# Patient Record
Sex: MALE | Race: OTHER | HISPANIC OR LATINO | Employment: FULL TIME | URBAN - METROPOLITAN AREA
[De-identification: names, ages, dates, MRNs, and addresses within clinical notes are randomized per-mention and may not be internally consistent; named-entity substitution may affect disease eponyms.]

---

## 2017-07-27 ENCOUNTER — GENERIC CONVERSION - ENCOUNTER (OUTPATIENT)
Dept: OTHER | Facility: OTHER | Age: 42
End: 2017-07-27

## 2017-07-27 ENCOUNTER — APPOINTMENT (EMERGENCY)
Dept: RADIOLOGY | Facility: HOSPITAL | Age: 42
DRG: 287 | End: 2017-07-27
Payer: COMMERCIAL

## 2017-07-27 ENCOUNTER — APPOINTMENT (INPATIENT)
Dept: NON INVASIVE DIAGNOSTICS | Facility: HOSPITAL | Age: 42
DRG: 287 | End: 2017-07-27
Payer: COMMERCIAL

## 2017-07-27 ENCOUNTER — APPOINTMENT (INPATIENT)
Dept: NON INVASIVE DIAGNOSTICS | Facility: HOSPITAL | Age: 42
DRG: 287 | End: 2017-07-27
Attending: INTERNAL MEDICINE
Payer: COMMERCIAL

## 2017-07-27 ENCOUNTER — HOSPITAL ENCOUNTER (INPATIENT)
Facility: HOSPITAL | Age: 42
LOS: 1 days | Discharge: HOME | DRG: 287 | End: 2017-07-28
Attending: EMERGENCY MEDICINE | Admitting: INTERNAL MEDICINE
Payer: COMMERCIAL

## 2017-07-27 DIAGNOSIS — R07.89 OTHER CHEST PAIN: ICD-10-CM

## 2017-07-27 DIAGNOSIS — I21.4 NSTEMI (NON-ST ELEVATED MYOCARDIAL INFARCTION) (HCC): Primary | ICD-10-CM

## 2017-07-27 DIAGNOSIS — R77.8 ELEVATED TROPONIN: ICD-10-CM

## 2017-07-27 PROBLEM — R07.9 CHEST PAIN: Status: ACTIVE | Noted: 2017-07-27

## 2017-07-27 LAB
ALBUMIN SERPL BCP-MCNC: 4 G/DL (ref 3.5–5)
ALP SERPL-CCNC: 75 U/L (ref 46–116)
ALT SERPL W P-5'-P-CCNC: 28 U/L (ref 12–78)
ANION GAP SERPL CALCULATED.3IONS-SCNC: 3 MMOL/L (ref 4–13)
APTT PPP: 29 SECONDS (ref 23–35)
AST SERPL W P-5'-P-CCNC: 18 U/L (ref 5–45)
BASOPHILS # BLD AUTO: 0.06 THOUSANDS/ΜL (ref 0–0.1)
BASOPHILS NFR BLD AUTO: 1 % (ref 0–1)
BILIRUB SERPL-MCNC: 0.26 MG/DL (ref 0.2–1)
BUN SERPL-MCNC: 15 MG/DL (ref 5–25)
CALCIUM SERPL-MCNC: 9.1 MG/DL (ref 8.3–10.1)
CHLORIDE SERPL-SCNC: 105 MMOL/L (ref 100–108)
CO2 SERPL-SCNC: 31 MMOL/L (ref 21–32)
CREAT SERPL-MCNC: 1.07 MG/DL (ref 0.6–1.3)
EOSINOPHIL # BLD AUTO: 0.38 THOUSAND/ΜL (ref 0–0.61)
EOSINOPHIL NFR BLD AUTO: 8 % (ref 0–6)
ERYTHROCYTE [DISTWIDTH] IN BLOOD BY AUTOMATED COUNT: 13.8 % (ref 11.6–15.1)
GFR SERPL CREATININE-BSD FRML MDRD: 86 ML/MIN/1.73SQ M
GLUCOSE SERPL-MCNC: 109 MG/DL (ref 65–140)
HCT VFR BLD AUTO: 40.2 % (ref 36.5–49.3)
HGB BLD-MCNC: 14 G/DL (ref 12–17)
INR PPP: 1.05 (ref 0.86–1.16)
LYMPHOCYTES # BLD AUTO: 2.49 THOUSANDS/ΜL (ref 0.6–4.47)
LYMPHOCYTES NFR BLD AUTO: 50 % (ref 14–44)
MCH RBC QN AUTO: 30.4 PG (ref 26.8–34.3)
MCHC RBC AUTO-ENTMCNC: 34.8 G/DL (ref 31.4–37.4)
MCV RBC AUTO: 87 FL (ref 82–98)
MONOCYTES # BLD AUTO: 0.6 THOUSAND/ΜL (ref 0.17–1.22)
MONOCYTES NFR BLD AUTO: 12 % (ref 4–12)
NEUTROPHILS # BLD AUTO: 1.41 THOUSANDS/ΜL (ref 1.85–7.62)
NEUTS SEG NFR BLD AUTO: 29 % (ref 43–75)
NRBC BLD AUTO-RTO: 0 /100 WBCS
PLATELET # BLD AUTO: 290 THOUSANDS/UL (ref 149–390)
PLATELET # BLD AUTO: 303 THOUSANDS/UL (ref 149–390)
PMV BLD AUTO: 10.9 FL (ref 8.9–12.7)
PMV BLD AUTO: 11 FL (ref 8.9–12.7)
POTASSIUM SERPL-SCNC: 4.9 MMOL/L (ref 3.5–5.3)
PROT SERPL-MCNC: 7.2 G/DL (ref 6.4–8.2)
PROTHROMBIN TIME: 13.7 SECONDS (ref 12.1–14.4)
RBC # BLD AUTO: 4.6 MILLION/UL (ref 3.88–5.62)
SODIUM SERPL-SCNC: 139 MMOL/L (ref 136–145)
SPECIMEN SOURCE: ABNORMAL
SPECIMEN SOURCE: NORMAL
TROPONIN I BLD-MCNC: 0 NG/ML (ref 0–0.08)
TROPONIN I BLD-MCNC: 0.25 NG/ML (ref 0–0.08)
TROPONIN I SERPL-MCNC: 0.52 NG/ML
TROPONIN I SERPL-MCNC: 11.49 NG/ML
TROPONIN I SERPL-MCNC: 21.72 NG/ML
TROPONIN I SERPL-MCNC: 22.19 NG/ML
WBC # BLD AUTO: 4.94 THOUSAND/UL (ref 4.31–10.16)

## 2017-07-27 PROCEDURE — 99152 MOD SED SAME PHYS/QHP 5/>YRS: CPT | Performed by: PHYSICIAN ASSISTANT

## 2017-07-27 PROCEDURE — C1769 GUIDE WIRE: HCPCS | Performed by: PHYSICIAN ASSISTANT

## 2017-07-27 PROCEDURE — 84484 ASSAY OF TROPONIN QUANT: CPT

## 2017-07-27 PROCEDURE — 99153 MOD SED SAME PHYS/QHP EA: CPT | Performed by: PHYSICIAN ASSISTANT

## 2017-07-27 PROCEDURE — 93005 ELECTROCARDIOGRAM TRACING: CPT

## 2017-07-27 PROCEDURE — 84484 ASSAY OF TROPONIN QUANT: CPT | Performed by: HOSPITALIST

## 2017-07-27 PROCEDURE — C1894 INTRO/SHEATH, NON-LASER: HCPCS | Performed by: PHYSICIAN ASSISTANT

## 2017-07-27 PROCEDURE — C1887 CATHETER, GUIDING: HCPCS | Performed by: PHYSICIAN ASSISTANT

## 2017-07-27 PROCEDURE — 85049 AUTOMATED PLATELET COUNT: CPT | Performed by: HOSPITALIST

## 2017-07-27 PROCEDURE — B2111ZZ FLUOROSCOPY OF MULTIPLE CORONARY ARTERIES USING LOW OSMOLAR CONTRAST: ICD-10-PCS | Performed by: HOSPITALIST

## 2017-07-27 PROCEDURE — 85730 THROMBOPLASTIN TIME PARTIAL: CPT | Performed by: EMERGENCY MEDICINE

## 2017-07-27 PROCEDURE — 96361 HYDRATE IV INFUSION ADD-ON: CPT

## 2017-07-27 PROCEDURE — 99285 EMERGENCY DEPT VISIT HI MDM: CPT

## 2017-07-27 PROCEDURE — 85025 COMPLETE CBC W/AUTO DIFF WBC: CPT | Performed by: EMERGENCY MEDICINE

## 2017-07-27 PROCEDURE — 85610 PROTHROMBIN TIME: CPT | Performed by: EMERGENCY MEDICINE

## 2017-07-27 PROCEDURE — 93005 ELECTROCARDIOGRAM TRACING: CPT | Performed by: EMERGENCY MEDICINE

## 2017-07-27 PROCEDURE — 71020 HB CHEST X-RAY 2VW FRONTAL&LATL: CPT

## 2017-07-27 PROCEDURE — 80053 COMPREHEN METABOLIC PANEL: CPT | Performed by: EMERGENCY MEDICINE

## 2017-07-27 PROCEDURE — 36415 COLL VENOUS BLD VENIPUNCTURE: CPT | Performed by: HOSPITALIST

## 2017-07-27 PROCEDURE — 93005 ELECTROCARDIOGRAM TRACING: CPT | Performed by: HOSPITALIST

## 2017-07-27 PROCEDURE — 93306 TTE W/DOPPLER COMPLETE: CPT

## 2017-07-27 PROCEDURE — 84484 ASSAY OF TROPONIN QUANT: CPT | Performed by: EMERGENCY MEDICINE

## 2017-07-27 PROCEDURE — 93458 L HRT ARTERY/VENTRICLE ANGIO: CPT | Performed by: PHYSICIAN ASSISTANT

## 2017-07-27 PROCEDURE — 96375 TX/PRO/DX INJ NEW DRUG ADDON: CPT

## 2017-07-27 PROCEDURE — B2151ZZ FLUOROSCOPY OF LEFT HEART USING LOW OSMOLAR CONTRAST: ICD-10-PCS | Performed by: HOSPITALIST

## 2017-07-27 PROCEDURE — 4A023N7 MEASUREMENT OF CARDIAC SAMPLING AND PRESSURE, LEFT HEART, PERCUTANEOUS APPROACH: ICD-10-PCS | Performed by: HOSPITALIST

## 2017-07-27 PROCEDURE — 36415 COLL VENOUS BLD VENIPUNCTURE: CPT | Performed by: EMERGENCY MEDICINE

## 2017-07-27 PROCEDURE — 96374 THER/PROPH/DIAG INJ IV PUSH: CPT

## 2017-07-27 RX ORDER — HEPARIN SODIUM 1000 [USP'U]/ML
2000 INJECTION, SOLUTION INTRAVENOUS; SUBCUTANEOUS AS NEEDED
Status: DISCONTINUED | OUTPATIENT
Start: 2017-07-27 | End: 2017-07-27

## 2017-07-27 RX ORDER — HEPARIN SODIUM 1000 [USP'U]/ML
INJECTION, SOLUTION INTRAVENOUS; SUBCUTANEOUS CODE/TRAUMA/SEDATION MEDICATION
Status: COMPLETED | OUTPATIENT
Start: 2017-07-27 | End: 2017-07-27

## 2017-07-27 RX ORDER — HEPARIN SODIUM 1000 [USP'U]/ML
4000 INJECTION, SOLUTION INTRAVENOUS; SUBCUTANEOUS AS NEEDED
Status: DISCONTINUED | OUTPATIENT
Start: 2017-07-27 | End: 2017-07-27

## 2017-07-27 RX ORDER — LIDOCAINE HYDROCHLORIDE 10 MG/ML
INJECTION, SOLUTION INFILTRATION; PERINEURAL CODE/TRAUMA/SEDATION MEDICATION
Status: COMPLETED | OUTPATIENT
Start: 2017-07-27 | End: 2017-07-27

## 2017-07-27 RX ORDER — LISINOPRIL 5 MG/1
5 TABLET ORAL DAILY
Status: DISCONTINUED | OUTPATIENT
Start: 2017-07-28 | End: 2017-07-28 | Stop reason: HOSPADM

## 2017-07-27 RX ORDER — NITROGLYCERIN 20 MG/100ML
INJECTION INTRAVENOUS CODE/TRAUMA/SEDATION MEDICATION
Status: COMPLETED | OUTPATIENT
Start: 2017-07-27 | End: 2017-07-27

## 2017-07-27 RX ORDER — KETOROLAC TROMETHAMINE 30 MG/ML
15 INJECTION, SOLUTION INTRAMUSCULAR; INTRAVENOUS EVERY 6 HOURS PRN
Status: DISCONTINUED | OUTPATIENT
Start: 2017-07-27 | End: 2017-07-28 | Stop reason: HOSPADM

## 2017-07-27 RX ORDER — MORPHINE SULFATE 2 MG/ML
2 INJECTION, SOLUTION INTRAMUSCULAR; INTRAVENOUS ONCE
Status: COMPLETED | OUTPATIENT
Start: 2017-07-27 | End: 2017-07-27

## 2017-07-27 RX ORDER — VERAPAMIL HYDROCHLORIDE 2.5 MG/ML
INJECTION, SOLUTION INTRAVENOUS CODE/TRAUMA/SEDATION MEDICATION
Status: COMPLETED | OUTPATIENT
Start: 2017-07-27 | End: 2017-07-27

## 2017-07-27 RX ORDER — NITROGLYCERIN 0.4 MG/1
0.4 TABLET SUBLINGUAL
Status: DISCONTINUED | OUTPATIENT
Start: 2017-07-27 | End: 2017-07-28 | Stop reason: HOSPADM

## 2017-07-27 RX ORDER — SODIUM CHLORIDE 9 MG/ML
100 INJECTION, SOLUTION INTRAVENOUS ONCE
Status: DISCONTINUED | OUTPATIENT
Start: 2017-07-28 | End: 2017-07-27

## 2017-07-27 RX ORDER — SODIUM CHLORIDE 9 MG/ML
125 INJECTION, SOLUTION INTRAVENOUS CONTINUOUS
Status: DISPENSED | OUTPATIENT
Start: 2017-07-27 | End: 2017-07-27

## 2017-07-27 RX ORDER — MAGNESIUM HYDROXIDE/ALUMINUM HYDROXICE/SIMETHICONE 120; 1200; 1200 MG/30ML; MG/30ML; MG/30ML
30 SUSPENSION ORAL EVERY 6 HOURS PRN
Status: DISCONTINUED | OUTPATIENT
Start: 2017-07-27 | End: 2017-07-28 | Stop reason: HOSPADM

## 2017-07-27 RX ORDER — ONDANSETRON 2 MG/ML
4 INJECTION INTRAMUSCULAR; INTRAVENOUS ONCE
Status: COMPLETED | OUTPATIENT
Start: 2017-07-27 | End: 2017-07-27

## 2017-07-27 RX ORDER — MIDAZOLAM HYDROCHLORIDE 1 MG/ML
INJECTION INTRAMUSCULAR; INTRAVENOUS CODE/TRAUMA/SEDATION MEDICATION
Status: COMPLETED | OUTPATIENT
Start: 2017-07-27 | End: 2017-07-27

## 2017-07-27 RX ORDER — ASPIRIN 81 MG/1
324 TABLET, CHEWABLE ORAL DAILY
Status: DISCONTINUED | OUTPATIENT
Start: 2017-07-27 | End: 2017-07-27

## 2017-07-27 RX ORDER — METOPROLOL SUCCINATE 25 MG/1
12.5 TABLET, EXTENDED RELEASE ORAL
Status: DISCONTINUED | OUTPATIENT
Start: 2017-07-27 | End: 2017-07-28 | Stop reason: HOSPADM

## 2017-07-27 RX ORDER — ONDANSETRON 2 MG/ML
4 INJECTION INTRAMUSCULAR; INTRAVENOUS EVERY 6 HOURS PRN
Status: DISCONTINUED | OUTPATIENT
Start: 2017-07-27 | End: 2017-07-28 | Stop reason: HOSPADM

## 2017-07-27 RX ORDER — FENTANYL CITRATE 50 UG/ML
INJECTION, SOLUTION INTRAMUSCULAR; INTRAVENOUS CODE/TRAUMA/SEDATION MEDICATION
Status: COMPLETED | OUTPATIENT
Start: 2017-07-27 | End: 2017-07-27

## 2017-07-27 RX ORDER — MAGNESIUM HYDROXIDE/ALUMINUM HYDROXICE/SIMETHICONE 120; 1200; 1200 MG/30ML; MG/30ML; MG/30ML
30 SUSPENSION ORAL ONCE
Status: COMPLETED | OUTPATIENT
Start: 2017-07-27 | End: 2017-07-27

## 2017-07-27 RX ORDER — SODIUM CHLORIDE 9 MG/ML
125 INJECTION, SOLUTION INTRAVENOUS CONTINUOUS
Status: DISCONTINUED | OUTPATIENT
Start: 2017-07-27 | End: 2017-07-27

## 2017-07-27 RX ORDER — ASPIRIN 81 MG/1
324 TABLET, CHEWABLE ORAL ONCE
Status: COMPLETED | OUTPATIENT
Start: 2017-07-27 | End: 2017-07-27

## 2017-07-27 RX ORDER — ACETAMINOPHEN 325 MG/1
650 TABLET ORAL EVERY 4 HOURS PRN
Status: DISCONTINUED | OUTPATIENT
Start: 2017-07-27 | End: 2017-07-28 | Stop reason: HOSPADM

## 2017-07-27 RX ORDER — CLOPIDOGREL BISULFATE 75 MG/1
TABLET ORAL
Status: DISPENSED
Start: 2017-07-27 | End: 2017-07-27

## 2017-07-27 RX ORDER — CLOPIDOGREL BISULFATE 75 MG/1
300 TABLET ORAL ONCE
Status: COMPLETED | OUTPATIENT
Start: 2017-07-27 | End: 2017-07-27

## 2017-07-27 RX ORDER — HEPARIN SODIUM 10000 [USP'U]/100ML
3-20 INJECTION, SOLUTION INTRAVENOUS
Status: DISCONTINUED | OUTPATIENT
Start: 2017-07-27 | End: 2017-07-27

## 2017-07-27 RX ORDER — HEPARIN SODIUM 1000 [USP'U]/ML
4000 INJECTION, SOLUTION INTRAVENOUS; SUBCUTANEOUS ONCE
Status: COMPLETED | OUTPATIENT
Start: 2017-07-27 | End: 2017-07-27

## 2017-07-27 RX ADMIN — FENTANYL CITRATE 50 MCG: 50 INJECTION, SOLUTION INTRAMUSCULAR; INTRAVENOUS at 13:54

## 2017-07-27 RX ADMIN — MIDAZOLAM 2 MG: 1 INJECTION INTRAMUSCULAR; INTRAVENOUS at 13:42

## 2017-07-27 RX ADMIN — LIDOCAINE HYDROCHLORIDE 1 ML: 10 INJECTION, SOLUTION INFILTRATION; PERINEURAL at 13:55

## 2017-07-27 RX ADMIN — LIDOCAINE HYDROCHLORIDE 15 ML: 20 SOLUTION ORAL; TOPICAL at 05:02

## 2017-07-27 RX ADMIN — HYDROMORPHONE HYDROCHLORIDE 1 MG: 1 INJECTION, SOLUTION INTRAMUSCULAR; INTRAVENOUS; SUBCUTANEOUS at 07:12

## 2017-07-27 RX ADMIN — FENTANYL CITRATE 50 MCG: 50 INJECTION, SOLUTION INTRAMUSCULAR; INTRAVENOUS at 13:42

## 2017-07-27 RX ADMIN — SODIUM CHLORIDE 1000 ML: 0.9 INJECTION, SOLUTION INTRAVENOUS at 04:38

## 2017-07-27 RX ADMIN — NITROGLYCERIN 0.4 MG: 0.4 TABLET SUBLINGUAL at 18:59

## 2017-07-27 RX ADMIN — CLOPIDOGREL BISULFATE 300 MG: 75 TABLET ORAL at 07:07

## 2017-07-27 RX ADMIN — IOHEXOL 60 ML: 350 INJECTION, SOLUTION INTRAVENOUS at 14:17

## 2017-07-27 RX ADMIN — ENOXAPARIN SODIUM 40 MG: 40 INJECTION SUBCUTANEOUS at 09:19

## 2017-07-27 RX ADMIN — METOPROLOL SUCCINATE 12.5 MG: 25 TABLET, EXTENDED RELEASE ORAL at 21:25

## 2017-07-27 RX ADMIN — ALUMINUM HYDROXIDE, MAGNESIUM HYDROXIDE, AND SIMETHICONE 30 ML: 200; 200; 20 SUSPENSION ORAL at 05:01

## 2017-07-27 RX ADMIN — NITROGLYCERIN 1 INCH: 20 OINTMENT TOPICAL at 07:13

## 2017-07-27 RX ADMIN — SODIUM CHLORIDE 125 ML/HR: 0.9 INJECTION, SOLUTION INTRAVENOUS at 14:53

## 2017-07-27 RX ADMIN — ASPIRIN 81 MG 324 MG: 81 TABLET ORAL at 04:38

## 2017-07-27 RX ADMIN — ONDANSETRON 4 MG: 2 INJECTION INTRAMUSCULAR; INTRAVENOUS at 05:17

## 2017-07-27 RX ADMIN — HEPARIN SODIUM 4000 UNITS: 1000 INJECTION, SOLUTION INTRAVENOUS; SUBCUTANEOUS at 07:15

## 2017-07-27 RX ADMIN — MIDAZOLAM 2 MG: 1 INJECTION INTRAMUSCULAR; INTRAVENOUS at 13:55

## 2017-07-27 RX ADMIN — HEPARIN SODIUM AND DEXTROSE 11.1 UNITS/KG/HR: 10000; 5 INJECTION INTRAVENOUS at 07:15

## 2017-07-27 RX ADMIN — VERAPAMIL HYDROCHLORIDE 2.5 MG: 2.5 INJECTION, SOLUTION INTRAVENOUS at 13:56

## 2017-07-27 RX ADMIN — HEPARIN SODIUM 4000 UNITS: 1000 INJECTION INTRAVENOUS; SUBCUTANEOUS at 13:56

## 2017-07-27 RX ADMIN — NITROGLYCERIN 200 MCG: 20 INJECTION INTRAVENOUS at 13:56

## 2017-07-27 RX ADMIN — MORPHINE SULFATE 2 MG: 2 INJECTION, SOLUTION INTRAMUSCULAR; INTRAVENOUS at 05:16

## 2017-07-27 RX ADMIN — ENOXAPARIN SODIUM 40 MG: 40 INJECTION SUBCUTANEOUS at 11:18

## 2017-07-28 VITALS
SYSTOLIC BLOOD PRESSURE: 128 MMHG | TEMPERATURE: 97.2 F | WEIGHT: 208.34 LBS | HEART RATE: 78 BPM | BODY MASS INDEX: 26.74 KG/M2 | OXYGEN SATURATION: 99 % | HEIGHT: 74 IN | RESPIRATION RATE: 18 BRPM | DIASTOLIC BLOOD PRESSURE: 94 MMHG

## 2017-07-28 PROBLEM — R07.9 CHEST PAIN: Status: RESOLVED | Noted: 2017-07-27 | Resolved: 2017-07-28

## 2017-07-28 LAB
ALBUMIN SERPL BCP-MCNC: 3.8 G/DL (ref 3.5–5)
ALP SERPL-CCNC: 58 U/L (ref 46–116)
ALT SERPL W P-5'-P-CCNC: 33 U/L (ref 12–78)
ANION GAP SERPL CALCULATED.3IONS-SCNC: 6 MMOL/L (ref 4–13)
AST SERPL W P-5'-P-CCNC: 41 U/L (ref 5–45)
ATRIAL RATE: 60 BPM
ATRIAL RATE: 69 BPM
ATRIAL RATE: 80 BPM
ATRIAL RATE: 83 BPM
BILIRUB SERPL-MCNC: 0.47 MG/DL (ref 0.2–1)
BUN SERPL-MCNC: 12 MG/DL (ref 5–25)
CALCIUM SERPL-MCNC: 8.7 MG/DL (ref 8.3–10.1)
CHLORIDE SERPL-SCNC: 105 MMOL/L (ref 100–108)
CHOLEST SERPL-MCNC: 222 MG/DL (ref 50–200)
CO2 SERPL-SCNC: 29 MMOL/L (ref 21–32)
CREAT SERPL-MCNC: 1.12 MG/DL (ref 0.6–1.3)
ERYTHROCYTE [DISTWIDTH] IN BLOOD BY AUTOMATED COUNT: 13.9 % (ref 11.6–15.1)
EST. AVERAGE GLUCOSE BLD GHB EST-MCNC: 126 MG/DL
GFR SERPL CREATININE-BSD FRML MDRD: 82 ML/MIN/1.73SQ M
GLUCOSE SERPL-MCNC: 98 MG/DL (ref 65–140)
HBA1C MFR BLD: 6 % (ref 4.2–6.3)
HCT VFR BLD AUTO: 41 % (ref 36.5–49.3)
HDLC SERPL-MCNC: 58 MG/DL (ref 40–60)
HGB BLD-MCNC: 14.1 G/DL (ref 12–17)
LDLC SERPL CALC-MCNC: 150 MG/DL (ref 0–100)
MCH RBC QN AUTO: 30.2 PG (ref 26.8–34.3)
MCHC RBC AUTO-ENTMCNC: 34.4 G/DL (ref 31.4–37.4)
MCV RBC AUTO: 88 FL (ref 82–98)
P AXIS: 23 DEGREES
P AXIS: 48 DEGREES
P AXIS: 62 DEGREES
P AXIS: 72 DEGREES
PLATELET # BLD AUTO: 284 THOUSANDS/UL (ref 149–390)
PMV BLD AUTO: 10.9 FL (ref 8.9–12.7)
POTASSIUM SERPL-SCNC: 4.1 MMOL/L (ref 3.5–5.3)
PR INTERVAL: 138 MS
PR INTERVAL: 142 MS
PR INTERVAL: 168 MS
PR INTERVAL: 168 MS
PROT SERPL-MCNC: 7.4 G/DL (ref 6.4–8.2)
QRS AXIS: 88 DEGREES
QRS AXIS: 88 DEGREES
QRS AXIS: 91 DEGREES
QRS AXIS: 92 DEGREES
QRSD INTERVAL: 100 MS
QRSD INTERVAL: 100 MS
QRSD INTERVAL: 96 MS
QRSD INTERVAL: 98 MS
QT INTERVAL: 374 MS
QT INTERVAL: 390 MS
QT INTERVAL: 394 MS
QT INTERVAL: 412 MS
QTC INTERVAL: 412 MS
QTC INTERVAL: 417 MS
QTC INTERVAL: 439 MS
QTC INTERVAL: 454 MS
RBC # BLD AUTO: 4.67 MILLION/UL (ref 3.88–5.62)
SODIUM SERPL-SCNC: 140 MMOL/L (ref 136–145)
T WAVE AXIS: 56 DEGREES
T WAVE AXIS: 63 DEGREES
T WAVE AXIS: 66 DEGREES
T WAVE AXIS: 75 DEGREES
TRIGL SERPL-MCNC: 72 MG/DL
VENTRICULAR RATE: 60 BPM
VENTRICULAR RATE: 69 BPM
VENTRICULAR RATE: 80 BPM
VENTRICULAR RATE: 83 BPM
WBC # BLD AUTO: 5.14 THOUSAND/UL (ref 4.31–10.16)

## 2017-07-28 PROCEDURE — 85027 COMPLETE CBC AUTOMATED: CPT | Performed by: PHYSICIAN ASSISTANT

## 2017-07-28 PROCEDURE — 80053 COMPREHEN METABOLIC PANEL: CPT | Performed by: PHYSICIAN ASSISTANT

## 2017-07-28 PROCEDURE — 83036 HEMOGLOBIN GLYCOSYLATED A1C: CPT | Performed by: PHYSICIAN ASSISTANT

## 2017-07-28 PROCEDURE — 80061 LIPID PANEL: CPT | Performed by: PHYSICIAN ASSISTANT

## 2017-07-28 RX ORDER — ACETAMINOPHEN 325 MG/1
TABLET ORAL
Qty: 30 TABLET | Refills: 0 | Status: SHIPPED | OUTPATIENT
Start: 2017-07-28 | End: 2018-05-16 | Stop reason: HOSPADM

## 2017-07-28 RX ORDER — METOPROLOL SUCCINATE 25 MG/1
12.5 TABLET, EXTENDED RELEASE ORAL
Qty: 30 TABLET | Refills: 0 | Status: SHIPPED | OUTPATIENT
Start: 2017-07-28 | End: 2018-01-24 | Stop reason: SDUPTHER

## 2017-07-28 RX ORDER — MAGNESIUM HYDROXIDE/ALUMINUM HYDROXICE/SIMETHICONE 120; 1200; 1200 MG/30ML; MG/30ML; MG/30ML
30 SUSPENSION ORAL EVERY 6 HOURS PRN
Qty: 355 ML | Refills: 0 | Status: SHIPPED | OUTPATIENT
Start: 2017-07-28 | End: 2018-05-16 | Stop reason: HOSPADM

## 2017-07-28 RX ORDER — LISINOPRIL 5 MG/1
5 TABLET ORAL DAILY
Qty: 30 TABLET | Refills: 0 | Status: SHIPPED | OUTPATIENT
Start: 2017-07-28 | End: 2018-02-05 | Stop reason: SDUPTHER

## 2017-07-28 RX ADMIN — LISINOPRIL 5 MG: 5 TABLET ORAL at 09:30

## 2017-07-31 ENCOUNTER — APPOINTMENT (EMERGENCY)
Dept: CT IMAGING | Facility: HOSPITAL | Age: 42
End: 2017-07-31
Payer: COMMERCIAL

## 2017-07-31 ENCOUNTER — GENERIC CONVERSION - ENCOUNTER (OUTPATIENT)
Dept: OTHER | Facility: OTHER | Age: 42
End: 2017-07-31

## 2017-07-31 ENCOUNTER — HOSPITAL ENCOUNTER (EMERGENCY)
Facility: HOSPITAL | Age: 42
Discharge: HOME | End: 2017-07-31
Attending: EMERGENCY MEDICINE | Admitting: EMERGENCY MEDICINE
Payer: COMMERCIAL

## 2017-07-31 VITALS
OXYGEN SATURATION: 100 % | TEMPERATURE: 98 F | SYSTOLIC BLOOD PRESSURE: 138 MMHG | DIASTOLIC BLOOD PRESSURE: 84 MMHG | RESPIRATION RATE: 18 BRPM | HEART RATE: 86 BPM

## 2017-07-31 DIAGNOSIS — R77.8 ELEVATED TROPONIN: ICD-10-CM

## 2017-07-31 DIAGNOSIS — R06.02 SHORTNESS OF BREATH: Primary | ICD-10-CM

## 2017-07-31 LAB
ANION GAP SERPL CALCULATED.3IONS-SCNC: 5 MMOL/L (ref 4–13)
ATRIAL RATE: 72 BPM
BASOPHILS # BLD AUTO: 0.04 THOUSANDS/ΜL (ref 0–0.1)
BASOPHILS NFR BLD AUTO: 1 % (ref 0–1)
BILIRUB UR QL STRIP: NEGATIVE
BUN SERPL-MCNC: 14 MG/DL (ref 5–25)
CALCIUM SERPL-MCNC: 9 MG/DL (ref 8.3–10.1)
CHLORIDE SERPL-SCNC: 102 MMOL/L (ref 100–108)
CLARITY UR: CLEAR
CO2 SERPL-SCNC: 30 MMOL/L (ref 21–32)
COLOR UR: YELLOW
COLOR, POC: YELLOW
CREAT SERPL-MCNC: 1.07 MG/DL (ref 0.6–1.3)
EOSINOPHIL # BLD AUTO: 0.04 THOUSAND/ΜL (ref 0–0.61)
EOSINOPHIL NFR BLD AUTO: 1 % (ref 0–6)
ERYTHROCYTE [DISTWIDTH] IN BLOOD BY AUTOMATED COUNT: 13.6 % (ref 11.6–15.1)
GFR SERPL CREATININE-BSD FRML MDRD: 86 ML/MIN/1.73SQ M
GLUCOSE SERPL-MCNC: 103 MG/DL (ref 65–140)
GLUCOSE UR STRIP-MCNC: NEGATIVE MG/DL
HCT VFR BLD AUTO: 39.5 % (ref 36.5–49.3)
HGB BLD-MCNC: 13.8 G/DL (ref 12–17)
HGB UR QL STRIP.AUTO: NEGATIVE
KETONES UR STRIP-MCNC: NEGATIVE MG/DL
LEUKOCYTE ESTERASE UR QL STRIP: NEGATIVE
LYMPHOCYTES # BLD AUTO: 1.13 THOUSANDS/ΜL (ref 0.6–4.47)
LYMPHOCYTES NFR BLD AUTO: 18 % (ref 14–44)
MCH RBC QN AUTO: 30.4 PG (ref 26.8–34.3)
MCHC RBC AUTO-ENTMCNC: 34.9 G/DL (ref 31.4–37.4)
MCV RBC AUTO: 87 FL (ref 82–98)
MONOCYTES # BLD AUTO: 0.44 THOUSAND/ΜL (ref 0.17–1.22)
MONOCYTES NFR BLD AUTO: 7 % (ref 4–12)
NEUTROPHILS # BLD AUTO: 4.53 THOUSANDS/ΜL (ref 1.85–7.62)
NEUTS SEG NFR BLD AUTO: 73 % (ref 43–75)
NITRITE UR QL STRIP: NEGATIVE
NRBC BLD AUTO-RTO: 0 /100 WBCS
P AXIS: 44 DEGREES
PH UR STRIP.AUTO: 6.5 [PH] (ref 4.5–8)
PLATELET # BLD AUTO: 314 THOUSANDS/UL (ref 149–390)
PMV BLD AUTO: 11 FL (ref 8.9–12.7)
POTASSIUM SERPL-SCNC: 4.1 MMOL/L (ref 3.5–5.3)
PR INTERVAL: 142 MS
PROT UR STRIP-MCNC: NEGATIVE MG/DL
QRS AXIS: 91 DEGREES
QRSD INTERVAL: 98 MS
QT INTERVAL: 392 MS
QTC INTERVAL: 429 MS
RBC # BLD AUTO: 4.54 MILLION/UL (ref 3.88–5.62)
SODIUM SERPL-SCNC: 137 MMOL/L (ref 136–145)
SP GR UR STRIP.AUTO: <=1.005 (ref 1–1.03)
T WAVE AXIS: 7 DEGREES
TROPONIN I SERPL-MCNC: 1.31 NG/ML
UROBILINOGEN UR QL STRIP.AUTO: 0.2 E.U./DL
VENTRICULAR RATE: 72 BPM
WBC # BLD AUTO: 6.18 THOUSAND/UL (ref 4.31–10.16)

## 2017-07-31 PROCEDURE — 81002 URINALYSIS NONAUTO W/O SCOPE: CPT | Performed by: EMERGENCY MEDICINE

## 2017-07-31 PROCEDURE — 81003 URINALYSIS AUTO W/O SCOPE: CPT

## 2017-07-31 PROCEDURE — 99285 EMERGENCY DEPT VISIT HI MDM: CPT

## 2017-07-31 PROCEDURE — 84484 ASSAY OF TROPONIN QUANT: CPT | Performed by: EMERGENCY MEDICINE

## 2017-07-31 PROCEDURE — 93005 ELECTROCARDIOGRAM TRACING: CPT | Performed by: EMERGENCY MEDICINE

## 2017-07-31 PROCEDURE — 71275 CT ANGIOGRAPHY CHEST: CPT

## 2017-07-31 PROCEDURE — 85025 COMPLETE CBC W/AUTO DIFF WBC: CPT | Performed by: EMERGENCY MEDICINE

## 2017-07-31 PROCEDURE — 80048 BASIC METABOLIC PNL TOTAL CA: CPT | Performed by: EMERGENCY MEDICINE

## 2017-07-31 PROCEDURE — 36415 COLL VENOUS BLD VENIPUNCTURE: CPT | Performed by: EMERGENCY MEDICINE

## 2017-07-31 RX ADMIN — IOHEXOL 85 ML: 350 INJECTION, SOLUTION INTRAVENOUS at 18:08

## 2017-08-01 ENCOUNTER — ALLSCRIPTS OFFICE VISIT (OUTPATIENT)
Dept: OTHER | Facility: OTHER | Age: 42
End: 2017-08-01

## 2017-08-14 ENCOUNTER — HOSPITAL ENCOUNTER (OUTPATIENT)
Dept: RADIOLOGY | Facility: HOSPITAL | Age: 42
Discharge: HOME | End: 2017-08-14
Attending: HOSPITALIST
Payer: COMMERCIAL

## 2017-08-14 DIAGNOSIS — R07.89 OTHER CHEST PAIN: ICD-10-CM

## 2017-08-14 PROCEDURE — A9585 GADOBUTROL INJECTION: HCPCS | Performed by: HOSPITALIST

## 2017-08-14 PROCEDURE — 75561 CARDIAC MRI FOR MORPH W/DYE: CPT

## 2017-08-14 RX ADMIN — GADOBUTROL 16 ML: 604.72 INJECTION INTRAVENOUS at 10:11

## 2017-08-15 ENCOUNTER — ALLSCRIPTS OFFICE VISIT (OUTPATIENT)
Dept: OTHER | Facility: OTHER | Age: 42
End: 2017-08-15

## 2017-09-01 ENCOUNTER — ALLSCRIPTS OFFICE VISIT (OUTPATIENT)
Dept: OTHER | Facility: OTHER | Age: 42
End: 2017-09-01

## 2017-09-21 ENCOUNTER — ALLSCRIPTS OFFICE VISIT (OUTPATIENT)
Dept: OTHER | Facility: OTHER | Age: 42
End: 2017-09-21

## 2017-10-05 ENCOUNTER — ALLSCRIPTS OFFICE VISIT (OUTPATIENT)
Dept: OTHER | Facility: OTHER | Age: 42
End: 2017-10-05

## 2017-10-06 NOTE — PROGRESS NOTES
Assessment  1  Anxiety, generalized (300 02) (F41 1)  2  Myocarditis (429 0) (I51 4)    Plan   Continue current medication  Follow-up in the office in one month  Follow-up with cardiology as discussed  Recommended when he is hyperventilating that he breathes in and out of her old brown paper bag  Discussion/Summary  The was counseled regarding diagnostic results,-instructions for management,-prognosis  total time of encounter was 32 [id number] minutes was spent counseling  Chief Complaint  follow up anxiety  no refills needed      History of Present Illness  Patient is a 29-year-old male presented to the office for follow-up on his anxiety and myocarditis  He has been to see cardiology in follow-up, they adjusted his medications somewhat  States emotionally he is much better  He is tolerating the intermittent chest pain without panicking  He does tend to overdoing things but he and his wife are coping much better  He states that his chest pain does get worse if he lifts something heavy, so he is try to avoid doing anything that requires a Valsalva maneuver  States the cardiology told him he should not start exercising until he has two good weeks  The patient is being seen for follow-up of generalized anxiety disorder  The patient reports doing well  He has no comorbid illnesses  Interval symptoms:  improved anxiety,-improved difficulty concentrating,-improved restlessness,-improved panic attacks,-improved sleep disruption-and-denies depression  Associated symptoms: racing thoughts, but-no periods of euphoria,-no hallucinations-and-no suicidal ideation  Medications:  the patient is adherent to his medication regimen, but-he denies medication side effects  Disease management:  the patient is doing well with his goals  Additional history: Patient still gets anxious at times, but he is dealing with it much better         Review of Systems    Constitutional: No fever or chills, feels well, no tiredness, no recent weight gain or weight loss  Eyes: No complaints of eye pain, no red eyes, no discharge from eyes, no itchy eyes  ENT: no complaints of earache, no hearing loss, no nosebleeds, no nasal discharge, no sore throat, no hoarseness  Cardiovascular: chest pain, but-the heart rate was not slow-and-the heart rate was not fast    Respiratory: He has had a couple episodes were it sounds like he was hyperventilating  Gastrointestinal: No complaints of abdominal pain, no constipation, no nausea or vomiting, no diarrhea or bloody stools  Genitourinary: no dysuria-and-no incontinence  Musculoskeletal: no arthralgias-and-no myalgias  Integumentary: no rashes  Neurological: No compliants of headache, no confusion, no convulsions, no numbness or tingling, no dizziness or fainting, no limb weakness, no difficulty walking  Psychiatric: as noted in HPI  Endocrine: No complaints of proptosis, no hot flashes, no muscle weakness, no erectile dysfunction, no deepening of the voice, no feelings of weakness  Hematologic/Lymphatic: No complaints of swollen glands, no swollen glands in the neck, does not bleed easily, no easy bruising  Active Problems  1  Anxiety, generalized (300 02) (F41 1)  2  Hyperlipidemia (272 4) (E78 5)  3  Myocarditis (429 0) (I51 4)    Past Medical History  1  History of Encounter for sterilization (V25 2) (Z30 2)  2  History of dermatitis (V13 3) (Z87 2)  3  History of scrotal mass (V13 89) (Z87 438)  4  History of viral gastroenteritis (V12 09) (Z86 19)  5  History of Leukopenia (288 50) (D72 819)  6  History of Right-sided low back pain without sciatica (724 2) (M54 5)    The active problems and past medical history were reviewed and updated today  Surgical History  1  History of Knee Arthroscopy With Medial And Lateral Meniscus Repair  2  History of Surgery Vas Deferens Vasectomy    Family History  Father   1  Family history of Diabetes  2   Family history of diabetes mellitus (V18 0) (Z83 3)  Sister   3  Family history of Cancer  Brother   4  Family history of Cancer  5  Family history of lung cancer (V16 1) (Z80 1)  6  Family history of Stroke    Social History   · Alcoholism in recovery (303 90) (F10 20)   · Denied: History of Drug use   · Never A Smoker   · Non-smoker (V49 89) (Z78 9)   · Uses Safety Equipment - Seatbelts  The social history was reviewed and updated today  The social history was reviewed and is unchanged  Current Meds  1  Colchicine 0 6 MG Oral Capsule; Take 1 tablet twice daily; Therapy: 98Nsl5804 to (Evaluate:23Jan2018)  Requested for: 03Zew5000; Last   Rx:12Vum1764 Ordered  2  DULoxetine HCl - 30 MG Oral Capsule Delayed Release Particles; TAKE 1 CAPSULE   Daily; Therapy: 80Max4047 to (Last Rx:85Ktk8248)  Requested for: 55Enn3055 Ordered  3  Ibuprofen 600 MG Oral Tablet; TAKE 1 TABLET 3 TIMES DAILY WITH FOOD AS NEEDED; Therapy: 29Dja5669 to (Evaluate:11Oct2017)  Requested for: 20QJU9955; Last   Rx:17Xbw9170 Ordered  4  Ibuprofen 800 MG Oral Tablet; TAKE 1 TABLET Every 8 hours  Requested for:   43Sjj8195; Last Rx:27Vch4438 Ordered  5  LORazepam 0 5 MG Oral Tablet; Take one half or one tablet up to 3 times daily as   needed; Therapy: 60Fir8595 to (Last Rx:34Jwn1648) Ordered  6  Losartan Potassium 25 MG Oral Tablet; TAKE 1 TABLET DAILY AS DIRECTED; Therapy: 11Yel0831 to (Evaluate:35Ust9250)  Requested for: 51Xql8327; Last   Rx:67Ooy4716 Ordered  7  Metoprolol Succinate ER 25 MG Oral Tablet Extended Release 24 Hour; Take 0 5 tablet   twice a day; Therapy: 24KRI2881 to [phone]); Last Rx:36Oqy2907 Ordered  8  PriLOSEC OTC 20 MG Oral Tablet Delayed Release; TAKE 1 TABLET DAILY; Therapy: 62RGP2628 to Recorded    The medication list was reviewed and updated today  Allergies  1   No Known Drug Allergies    Vitals  Vital Signs    Recorded: 78FDS9290 78:06YJ   Systolic 558   Diastolic 82   Height 6 ft 2 in   Weight 203 lb BMI Calculated 26 06   BSA Calculated 2 19     Physical Exam    Constitutional   General appearance: No acute distress, well appearing and well nourished  Eyes   Conjunctiva and lids: No swelling, erythema, or discharge  Pupils and irises: Equal, round and reactive to light  Ears, Nose, Mouth, and Throat   External inspection of ears and nose: Normal     Otoscopic examination: Tympanic membrance translucent with normal light reflex  Canals patent without erythema  Nasal mucosa, septum, and turbinates: Normal without edema or erythema  Oropharynx: Normal with no erythema, edema, exudate or lesions  Pulmonary   Respiratory effort: No increased work of breathing or signs of respiratory distress  Auscultation of lungs: Clear to auscultation, equal breath sounds bilaterally, no wheezes, no rales, no rhonci  Cardiovascular   Palpation of heart: Normal PMI, no thrills  Auscultation of heart: Normal rate and rhythm, normal S1 and S2, without murmurs  Examination of extremities for edema and/or varicosities: Normal     Carotid pulses: Normal     Abdomen   Abdomen: Non-tender, no masses  Liver and spleen: No hepatomegaly or splenomegaly  Lymphatic   Palpation of lymph nodes in neck: No lymphadenopathy  Musculoskeletal   Gait and station: Normal     Digits and nails: Normal without clubbing or cyanosis  Inspection/palpation of joints, bones, and muscles: Normal     Skin   Skin and subcutaneous tissue: Normal without rashes or lesions  Neurologic   Cranial nerves: Cranial nerves 2-12 intact  Reflexes: 2+ and symmetric  Sensation: No sensory loss  Psychiatric   Orientation to person, place and time: Normal     Mood and affect: Abnormal   Mood and Affect: appropriate mood,-appropriate affect,-anxious,-expansive,-not indifferent,-not labile-and-pleased          Future Appointments    Date/Time Provider Specialty Site   10/17/2017 04:00 PM TAYLA Domínguez , PhD Cardiology [id number] [facility]     Signatures   Electronically signed by : Edie Schwartz DO; Oct  5 2017  4:08PM EST                       (Author)    Electronically signed by : Edie Schwartz DO; Oct  5 2017  4:09PM EST                       (Author)

## 2017-10-17 ENCOUNTER — GENERIC CONVERSION - ENCOUNTER (OUTPATIENT)
Dept: OTHER | Facility: OTHER | Age: 42
End: 2017-10-17

## 2017-10-27 NOTE — PROGRESS NOTES
Plan  Myocarditis    · From  Metoprolol Succinate ER 25 MG Oral Tablet Extended Release 24 Hour  take 0 5 tablet daily To Metoprolol Succinate ER 25 MG Oral Tablet Extended Release 24  Hour Take 0 5 tablet twice a day   Rx By: Katie Sommer; Dispense: 30 Days ; #:30 Tablet Extended Release 24 Hour; Refill: 0;For: Myocarditis; GRACE = N; Record    Discussion/Summary  Cardiology Discussion Summary Free Text Note Form [facility]:   # +troponin: Possible myocarditis/myopericarditis with normal LVEF by TTE  Clean coronaries on cardiac cath  TTE w/o focal WMA  cMRI is pending for evaluation of EF, scar and/or edema  Euvolemic on exam  Symptomatically appears to be improving  cMRI shows LVEF --45-50% w/ top normal RV size, but mildly reduced function  No e/o scar, inflammation, or infiltrative disease  Continues to have pericarditis symptoms  reduction in function we will do the following: Nitro prnmetoprolol succinate 12 5 mg PO BIDlosartan 25 mg PO dailystop ibuprofen, continue colchicineduloxetine  GERD: Continue prilosecAnxiety: Patient has a component of anxiety as well related to his recent diagnosis  States it is improving as well  in 1 month  Chief Complaint  Chief Complaint Free Text Note Form: Patient here today for f/u      History of Present Illness  Cardiology HPI Free Text Note Form [facility]: Very pleasant 38year old gentleman w/ no PMHx p/w acute onset retrosternal pressure to [facility] on 7/27/17  Zofran and viscous lidocaine given for nausea/emesis and possible reflux, respectively without resolution  Trop trend 0 00->0 25->11 4  S/p cardiac cath with no coronary disease  Symptoms likely from myocarditis +/- myopericarditis vs vasospasm  ECG ok  Seen by Dr Yo Bella in f/u  The patient was started on prilosec for GERD with improvement in throat tightness and switched from lisinopril to losartan    states that he continues to feel a little bit of pressure in the chest when he leans backwards   He states his breathing was worse since discharge, so he was switched to losartan 25 mg PO daily  He is taking prilosec and thinks this may have helped a bit  He thinks it is getting better and better  He had his cMRI on 8/14/2017 w/ results pending  he returns for f/u, 9/21/2017, he states he continues to feel a little bit of pressure in the chest when he leans backwards  Last week he felt a little better, and then he worked hard at work, and it exacerbated it over the weekend  He sleeps sitting up due to the pain  Review of Systems  Cardiology Male ROS:     Cardiac: as noted in HPI  Skin: No complaints of nonhealing sores or skin rash  Genitourinary: No complaints of recurrent urinary tract infections, frequent urination at night, difficult urination, blood in urine, kidney stones, loss of bladder control, no kidney or prostate problems, no erectile dysfunction  Psychological: No complaints of feeling depressed, anxiety, panic attacks, or difficulty concentrating  General: No complaints of trouble sleeping, lack of energy, fatigue, appetite changes, weight changes, fever, frequent infections, or night sweats  Respiratory: No complaints of shortness of breath, cough with sputum, or wheezing  HEENT: No complaints of serious problems, hearing problems, nose problems, throat problems, or snoring  Gastrointestinal: No complaints of liver problems, nausea, vomiting, heartburn, constipation, bloody stools, diarrhea, problems swallowing, adbominal pain, or rectal bleeding  Hematologic: No complaints of bleeding disorders, anemia, blood clots, or excessive brusing  Neurological: No complaints of numbness, tingling, dizziness, weakness, seizures, headaches, syncope or fainting, AM fatigue, daytime sleepiness, no witnessed apnea episodes  Musculoskeletal: No complaints of arthritis, back pain, or painfull swelling  ROS Reviewed:   ROS reviewed  Active Problems  Problems    1   Anxiety, generalized (300 02) (F41 1)   2  Hyperlipidemia (272 4) (E78 5)   3  Myocarditis (429 0) (I51 4)    Past Medical History  Problems    1  History of Encounter for sterilization (V25 2) (Z30 2)   2  History of dermatitis (V13 3) (Z87 2)   3  History of scrotal mass (V13 89) (Z87 438)   4  History of viral gastroenteritis (V12 09) (Z86 19)   5  History of Leukopenia (288 50) (D72 819)   6  History of Right-sided low back pain without sciatica (724 2) (M54 5)  Active Problems And Past Medical History Reviewed: The active problems and past medical history were reviewed and updated today  Surgical History  Problems    1  History of Knee Arthroscopy With Medial And Lateral Meniscus Repair   2  History of Surgery Vas Deferens Vasectomy  Surgical History Reviewed: The surgical history was reviewed and updated today  Family History  Father    1  Family history of Diabetes   2  Family history of diabetes mellitus (V18 0) (Z83 3)  Sister    3  Family history of Cancer  Brother    4  Family history of Cancer   5  Family history of lung cancer (V16 1) (Z80 1)   6  Family history of Stroke  Family History Reviewed: The family history was reviewed and updated today  Social History  Problems    · Alcoholism in recovery (303 90) (F10 20)   · Denied: History of Drug use   · Never A Smoker   · Non-smoker (V49 89) (Z78 9)   · Uses Safety Equipment - Seatbelts  Social History Reviewed: The social history was reviewed and updated today  The social history was reviewed and is unchanged  Current Meds   1  Colchicine 0 6 MG Oral Capsule; Take 1 tablet twice daily; Therapy: 46Slg7584 to (Evaluate:84Ikx1505)  Requested for: 60Nvn1656; Last   Rx:71Meq0293 Ordered   2  DULoxetine HCl - 30 MG Oral Capsule Delayed Release Particles; TAKE 1 CAPSULE   Daily; Therapy: 81Mul2423 to (Last Rx:39Jpg1021)  Requested for: 01Sep2017 Ordered   3  Ibuprofen 600 MG Oral Tablet; TAKE 1 TABLET 3 TIMES DAILY WITH FOOD AS NEEDED;    Therapy: 97XOB0087 to (Evaluate:81Yvr4923)  Requested for: 36Int1453; Last   Rx:19Chg2700 Ordered   4  LORazepam 0 5 MG Oral Tablet; Take one half or one tablet up to 3 times daily as   needed; Therapy: 11Dmt9327 to (Last Rx:01Sep2017) Ordered   5  Losartan Potassium 25 MG Oral Tablet; TAKE 1 TABLET DAILY AS DIRECTED; Therapy: 06Fox7026 to (Evaluate:41Sso1798)  Requested for: 78Hey9372; Last   Rx:78Flf4243 Ordered   6  Metoprolol Succinate ER 25 MG Oral Tablet Extended Release 24 Hour; take 0 5 tablet   daily; Therapy: 11AOY0332 to Recorded   7  Metoprolol Succinate ER 25 MG Oral Tablet Extended Release 24 Hour; Take 1 tablet   daily  Requested for: 96Dat1767; Last Rx:99Pvu2543 Ordered   8  PriLOSEC OTC 20 MG Oral Tablet Delayed Release; TAKE 1 TABLET DAILY; Therapy: 19ZYW7344 to Recorded  Medication List Reviewed: The medication list was reviewed and updated today  Allergies  Medication    1  No Known Drug Allergies    Vitals  Vital Signs    Recorded: 37CPS1788 04:15PM   Heart Rate 74, R Radial   Systolic 627, RUE, Sitting   Diastolic 84, RUE, Sitting   Height 6 ft 2 in   Weight 201 lb 3 oz   BMI Calculated 25 83   BSA Calculated 2 18   O2 Saturation 99     Physical Exam    Constitutional   General appearance: No acute distress, well appearing and well nourished  Eyes   Conjunctiva and Sclera examination: Conjunctiva pink, sclera anicteric  Ears, Nose, Mouth, and Throat - Oropharynx: Clear, nares are clear, mucous membranes are moist    Neck   Neck and thyroid: Normal, supple, trachea midline, no thyromegaly  Pulmonary   Respiratory effort: No increased work of breathing or signs of respiratory distress  Auscultation of lungs: Clear to auscultation, no rales, no rhonchi, no wheezing, good air movement  Cardiovascular   Auscultation of heart: Normal rate and rhythm, normal S1 and S2, no murmurs  Carotid pulses: Normal, 2+ bilaterally      Peripheral vascular exam: Normal pulses throughout, no tenderness, erythema or swelling  Pedal pulses: Normal, 2+ bilaterally  Examination of extremities for edema and/or varicosities: Normal     Abdomen   Abdomen: Non-tender and no distention  Liver and spleen: No hepatomegaly or splenomegaly  Musculoskeletal Gait and station: Normal gait  -- Digits and nails: Normal without clubbing or cyanosis  -- Inspection/palpation of joints, bones, and muscles: Normal, ROM normal     Skin - Skin and subcutaneous tissue: Normal without rashes or lesions  Skin is warm and well perfused, normal turgor  Neurologic - Cranial nerves: II - XII intact  -- Speech: Normal     Psychiatric - Orientation to person, place, and time: Normal -- Mood and affect: Normal       Results/Data  Diagnostic Studies Reviewed Cardio: I personally reviewed the recording/images in the office today  My interpretation follows  Future Appointments    Date/Time Provider Specialty Site   10/05/2017 03:30 PM Jo Ann Lai DO Family Medicine [id number]     Signatures   Electronically signed by : TAYLA Pereyra  Sep 21 2017  4:47PM EST                       (Author)

## 2017-11-15 ENCOUNTER — ALLSCRIPTS OFFICE VISIT (OUTPATIENT)
Dept: OTHER | Facility: OTHER | Age: 42
End: 2017-11-15

## 2017-11-16 NOTE — PROGRESS NOTES
Assessment    1  Myocarditis (429 0) (I51 4)   2  Anxiety, generalized (300 02) (F41 1)   3  Costochondritis (733 6) (M94 0)   4  Need for influenza vaccination (V04 81) (Z23)    Plan  Need for influenza vaccination    · Fluzone Quadrivalent 0 5 ML Intramuscular Suspension Prefilled Syringe;INJECT 0 5  ML Intramuscular; To Be Done: [id number]  He will use warm compresses or heating pad twice a day for his lateral chest wall discomfort  Continue other medications  Continue on the fluoxetine for now  I will see him in 3 months to see how he is doing  He needs to follow up with Cardiology  Discussion/Summary  The patient was counseled regarding  total time of encounter was 25 minutes-- and-- 18 minutes was spent counseling  Possible side effects of new medications were reviewed with the patient/guardian today  The treatment plan was reviewed with the patient/guardian  The patient/guardian understands and agrees with the treatment plan      Chief Complaint  follow up anxiety  refill duloxetine 30 days      History of Present Illness  Patient is a 44-year-old male presenting to the office for follow-up on his anxiety, myocarditis, and general health  He would like to get a flu shot today  States that his chest pain his improved dramatically over the last week  He did have a couple bad days last week  Today he is feeling quite well except for a right lateral chest wall discomfort that he has had since last week  He denies any recent trauma or overuse  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  His anxiety is well controlled on his current medications, states that he is no longer overwhelmed when he has physical symptoms  Review of Systems   Constitutional: No fever or chills, feels well, no tiredness, no recent weight gain or weight loss  Eyes: No complaints of eye pain, no red eyes, no discharge from eyes, no itchy eyes    ENT: no complaints of earache, no hearing loss, no nosebleeds, no nasal discharge, no sore throat, no hoarseness  Cardiovascular: No complaints of slow heart rate, no fast heart rate, no chest pain, no palpitations, no leg claudication, no lower extremity-- and-- as noted in HPI  Respiratory: No complaints of shortness of breath, no wheezing, no cough, no SOB on exertion, no orthopnea or PND  Gastrointestinal: No complaints of abdominal pain, no constipation, no nausea or vomiting, no diarrhea or bloody stools  Genitourinary: no dysuria  Musculoskeletal: Right lateral chest wall discomfort , but-- as noted in HPI  Integumentary: No complaints of skin rash or skin lesions, no itching, no skin wound, no dry skin  Neurological: No compliants of headache, no confusion, no convulsions, no numbness or tingling, no dizziness or fainting, no limb weakness, no difficulty walking  Psychiatric: anxiety-- and-- Stable on current medications  Endocrine: No complaints of proptosis, no hot flashes, no muscle weakness, no erectile dysfunction, no deepening of the voice, no feelings of weakness  Hematologic/Lymphatic: No complaints of swollen glands, no swollen glands in the neck, does not bleed easily, no easy bruising  Active Problems  1  Anxiety, generalized (300 02) (F41 1)   2  Hyperlipidemia (272 4) (E78 5)   3  Myocarditis (429 0) (I51 4)    Past Medical History  1  History of Encounter for sterilization (V25 2) (Z30 2)   2  History of dermatitis (V13 3) (Z87 2)   3  History of scrotal mass (V13 89) (Z87 438)   4  History of viral gastroenteritis (V12 09) (Z86 19)   5  History of Leukopenia (288 50) (D72 819)   6  History of Right-sided low back pain without sciatica (724 2) (M54 5)    The active problems and past medical history were reviewed and updated today  Surgical History  1  History of Knee Arthroscopy With Medial And Lateral Meniscus Repair   2  History of Surgery Vas Deferens Vasectomy    Family History  Father    1   Family history of Diabetes 2  Family history of diabetes mellitus (V18 0) (Z83 3)  Sister    3  Family history of Cancer  Brother    4  Family history of Cancer   5  Family history of lung cancer (V16 1) (Z80 1)   6  Family history of Stroke    The family history was reviewed and updated today  Social History     · Alcoholism in recovery (303 90) (F10 20)   · Denied: History of Drug use   · Never A Smoker   · Non-smoker (V49 89) (Z78 9)   · Uses Safety Equipment - Seatbelts  The social history was reviewed and updated today  The social history was reviewed and is unchanged  Current Meds   1  Colchicine 0 6 MG Oral Capsule; Take 1 tablet twice daily; Therapy: 33Vxl3627 to (Evaluate:23Jan2018)  Requested for: 55Fsc3950; Last Rx:34Nvt7067 Ordered   2  DULoxetine HCl - 30 MG Oral Capsule Delayed Release Particles; TAKE 1 CAPSULE Daily; Therapy: 78Vtx4111 to (Last Rx:44Hbd5010)  Requested for: 54Vft8732 Ordered   3  Ibuprofen 800 MG Oral Tablet; TAKE 1 TABLET Every 8 hours  Requested for: 29Qbg3293; Last Rx:20Nkj3188 Ordered   4  LORazepam 0 5 MG Oral Tablet; Take one half or one tablet up to 3 times daily as needed; Therapy: 01Ept5401 to (Last Rx:39Dxe8334) Ordered   5  Losartan Potassium 25 MG Oral Tablet; TAKE 1 TABLET DAILY AS DIRECTED; Therapy: 19Fxc1188 to (Evaluate:18Wir5877)  Requested for: 29Edc7550; Last Rx:91Bdo5804 Ordered   6  Metoprolol Succinate ER 25 MG Oral Tablet Extended Release 24 Hour; Take 0 5 tablet twice a day; Therapy: 74CYY5077 to (77 873 135); Last Rx:25Rxe7399 Ordered   7  PriLOSEC OTC 20 MG Oral Tablet Delayed Release; TAKE 1 TABLET DAILY; Therapy: 84IJZ5209 to Recorded    The medication list was reviewed and updated today  Allergies  1   No Known Drug Allergies    Vitals  Vital Signs    Recorded: 44FAT4295 22:97RE   Systolic 752   Diastolic 80   Height 6 ft 2 in   Weight 215 lb    BMI Calculated 27 6   BSA Calculated 2 24       Physical Exam   Constitutional  General appearance: No acute distress, well appearing and well nourished  Eyes  Conjunctiva and lids: No swelling, erythema, or discharge  Pupils and irises: Equal, round and reactive to light  Ears, Nose, Mouth, and Throat  External inspection of ears and nose: Normal    Otoscopic examination: Tympanic membrance translucent with normal light reflex  Canals patent without erythema  Nasal mucosa, septum, and turbinates: Normal without edema or erythema  Oropharynx: Normal with no erythema, edema, exudate or lesions  Pulmonary  Respiratory effort: No increased work of breathing or signs of respiratory distress  Auscultation of lungs: Clear to auscultation, equal breath sounds bilaterally, no wheezes, no rales, no rhonci  Cardiovascular  Palpation of heart: Normal PMI, no thrills  Auscultation of heart: Normal rate and rhythm, normal S1 and S2, without murmurs  Examination of extremities for edema and/or varicosities: Normal    Carotid pulses: Normal    Abdomen  Abdomen: Non-tender, no masses  Liver and spleen: No hepatomegaly or splenomegaly  Lymphatic  Palpation of lymph nodes in neck: No lymphadenopathy  Musculoskeletal  Gait and station: Normal    Digits and nails: Normal without clubbing or cyanosis  Inspection/palpation of joints, bones, and muscles: Normal  -- Mid axillary line around the level of the 5th to 6 rib there is a palpable area of tenderness between the ribs  There is no mass  There is no crepitance  Skin  Skin and subcutaneous tissue: Normal without rashes or lesions  Neurologic  Cranial nerves: Cranial nerves 2-12 intact  Reflexes: 2+ and symmetric  Sensation: No sensory loss     Psychiatric  Orientation to person, place and time: Normal    Mood and affect: Abnormal   Mood and Affect: appropriate mood,-- appropriate affect,-- not anxious,-- concerned,-- not despairing,-- not dysphoric,-- expansive,-- not fearful,-- not frightened,-- not frustrated,-- not grieving,-- not indifferent,-- not labile-- and-- pleased          Future Appointments    Date/Time Provider Specialty Site   11/28/2017 04:00 PM TAYLA Ayoub , PhD Cardiology [facility]   Electronically signed by : Lcuiana Silva DO; Nov 15 2017  4:00PM EST                       (Author)

## 2017-11-27 ENCOUNTER — GENERIC CONVERSION - ENCOUNTER (OUTPATIENT)
Dept: OTHER | Facility: OTHER | Age: 42
End: 2017-11-27

## 2018-01-09 NOTE — RESULT NOTES
Verified Results  * XR SPINE LUMBAR MINIMUM 4 VIEWS [id number] 05:34PM Loni Cooper Order Number: [id number]     Test Name Result Flag Reference   XR SPINE LUMBAR MINIMUM 4 VIEWS (Report)     LUMBAR SPINE     INDICATION: Low back pain     COMPARISON: None     VIEWS: AP, lateral, bilateral oblique and coned down projections; 5 images     FINDINGS:     Alignment is unremarkable  There is no radiographic evidence of acute fracture or destructive osseous lesion  There is slight narrowing at L5-S1  Visualized soft tissues appear unremarkable  IMPRESSION:     Slight L5-S1 disc space narrowing  Workstation performed: [id number]     Signed by:   Gay Chaudhari MD   7/15/16       Discussion/Summary   Mild narrowing of the lowest disc space in his back  The mean oh how he's doing with the medicine

## 2018-01-11 NOTE — RESULT NOTES
Verified Results  US SCROTUM AND TESTICLES [id number] 05:34PM Raymond Halsted Order Number: [id number]    Order Number: [id number]     Test Name Result Flag Reference   US SCROTUM AND TESTICLES (Report)     SCROTAL ULTRASOUND     INDICATION: Palpable lump left scrotum  COMPARISON: None  TECHNIQUE:  Ultrasound the scrotal contents was performed with a high frequency linear transducer utilizing volumetric sweep imaging as well as standard still image techniques  Imaging performed in longitudinal and transverse orientation  Color and    spectral Doppler evaluation also performed bilaterally  FINDINGS:     TESTES:    Testes are symmetric and normal in size  RIGHT testis = 4 8 x 3 1 x 2 3 cm    Normal contour with homogeneous smooth echotexture  No intratesticular mass lesion or calcifications  LEFT testis = 4 7 x 2 2 x 3 3 cm   Normal contour with homogeneous smooth echotexture  No intratesticular mass lesion or calcifications  Doppler flow within both testes is present and symmetric  EPIDIDYMIDES:    Epididymal heads are unremarkable  The palpable abnormality corresponds to an enlarged left epididymal tail without significantly increased vascularity  HYDROCELE: Small right-sided hydrocele     VARICOCELE: None present  SCROTUM: Scrotal thickness and appearance within normal limits  No evidence for extratesticular mass or hernia demonstrated  IMPRESSION:   Slight prominence of left epididymal tail which corresponds to the palpable abnormality  No significantly increased vascularity observed  No discrete mass or collection  Consider mild epididymitis  Follow-up MRI recommended in several weeks if    symptoms persist to exclude underlying epididymal mass such as adenomatoid tumor or sperm granuloma  Small right-sided hydrocele  Workstation performed: [id number]     Signed by:    Liset Desai DO   7/15/16       Discussion/Summary   Ultrasound showed some swelling of the epididymis  Does he have any tenderness or pain in that area? If so, we should put him on some antibiotics  If not, we can observe the area  If he drinks down we don't have to do anything, if persists in a month or 2 they would recommend having an MRI of the area

## 2018-01-12 VITALS
OXYGEN SATURATION: 99 % | BODY MASS INDEX: 25.82 KG/M2 | HEART RATE: 74 BPM | HEIGHT: 74 IN | DIASTOLIC BLOOD PRESSURE: 84 MMHG | SYSTOLIC BLOOD PRESSURE: 128 MMHG | WEIGHT: 201.19 LBS

## 2018-01-13 VITALS
BODY MASS INDEX: 25.96 KG/M2 | HEIGHT: 74 IN | HEART RATE: 74 BPM | SYSTOLIC BLOOD PRESSURE: 138 MMHG | WEIGHT: 202.25 LBS | DIASTOLIC BLOOD PRESSURE: 82 MMHG

## 2018-01-13 VITALS
SYSTOLIC BLOOD PRESSURE: 130 MMHG | DIASTOLIC BLOOD PRESSURE: 80 MMHG | HEIGHT: 74 IN | WEIGHT: 215 LBS | BODY MASS INDEX: 27.59 KG/M2

## 2018-01-14 VITALS
HEIGHT: 74 IN | HEART RATE: 63 BPM | BODY MASS INDEX: 26.69 KG/M2 | DIASTOLIC BLOOD PRESSURE: 72 MMHG | WEIGHT: 208 LBS | SYSTOLIC BLOOD PRESSURE: 126 MMHG

## 2018-01-14 VITALS
BODY MASS INDEX: 25.97 KG/M2 | SYSTOLIC BLOOD PRESSURE: 140 MMHG | DIASTOLIC BLOOD PRESSURE: 88 MMHG | HEIGHT: 74 IN | TEMPERATURE: 96.7 F | WEIGHT: 202.38 LBS

## 2018-01-14 VITALS
DIASTOLIC BLOOD PRESSURE: 82 MMHG | WEIGHT: 203 LBS | HEIGHT: 74 IN | SYSTOLIC BLOOD PRESSURE: 118 MMHG | BODY MASS INDEX: 26.05 KG/M2

## 2018-01-15 NOTE — RESULT NOTES
Verified Results  (1) COMPREHENSIVE METABOLIC PANEL 53RXI6057 48:34OL [facility] Order Number: [id number]      National Kidney Disease Education Program recommendations are as follows:  GFR calculation is accurate only with a steady state creatinine  Chronic Kidney disease less than 60 ml/min/1 73 sq  meters  Kidney failure less than 15 ml/min/1 73 sq  meters  Test Name Result Flag Reference   GLUCOSE,RANDM 88 mg/dL     If the patient is fasting, the ADA then defines impaired fasting glucose as > 100 mg/dL and diabetes as > or equal to 123 mg/dL     SODIUM 140 mmol/L  136-145   POTASSIUM 5 2 mmol/L  3 5-5 3   CHLORIDE 106 mmol/L  100-108   CARBON DIOXIDE 28 mmol/L  21-32   ANION GAP (CALC) 6 mmol/L  4-13   BLOOD UREA NITROGEN 15 mg/dL  5-25   CREATININE 0 97 mg/dL  0 60-1 30   Standardized to IDMS reference method   CALCIUM 9 2 mg/dL  8 3-10 1   BILI, TOTAL 0 45 mg/dL  0 20-1 00   ALK PHOSPHATAS 70 U/L     ALT (SGPT) 24 U/L  12-78   AST(SGOT) 11 U/L  5-45   ALBUMIN 4 1 g/dL  3 5-5 0   TOTAL PROTEIN 7 2 g/dL  6 4-8 2   eGFR Non-African American      >60 0 ml/min/1 73sq m     (1) CBC/PLT/DIFF [id number] 03:33PM Joselo Gray     Test Name Result Flag Reference   WBC COUNT 3 65 Thousand/uL L 4 31-10 16   RBC COUNT 4 52 Million/uL  3 88-5 62   HEMOGLOBIN 13 5 g/dL  12 0-17 0   HEMATOCRIT 39 7 %  36 5-49 3   MCV 88 fL  82-98   MCH 29 9 pg  26 8-34 3   MCHC 34 0 g/dL  31 4-37 4   RDW 13 8 %  11 6-15 1   MPV 11 8 fL  8 9-12 7   PLATELET COUNT 410 Thousands/uL  149-390   nRBC AUTOMATED 0 /100 WBCs     NEUTROPHILS RELATIVE PERCENT 31 % L 43-75   LYMPHOCYTES RELATIVE PERCENT 50 % H 14-44   MONOCYTES RELATIVE PERCENT 10 %  4-12   EOSINOPHILS RELATIVE PERCENT 7 % H 0-6   BASOPHILS RELATIVE PERCENT 2 % H 0-1   NEUTROPHILS ABSOLUTE COUNT 1 13 Thousands/µL L 1 85-7 62   LYMPHOCYTES ABSOLUTE COUNT 1 78 Thousands/µL  0 60-4 47   MONOCYTES ABSOLUTE COUNT 0 38 Thousand/µL  0 17-1 22   EOSINOPHILS ABSOLUTE COUNT 0 27 Thousand/µL  0 00-0 61   BASOPHILS ABSOLUTE COUNT 0 08 Thousands/µL  0 00-0 10     (1) LIPID PANEL, FASTING [id number] 03:33PM Meri Hanley   Triglyceride:         Normal              <150 mg/dl       Borderline High    150-199 mg/dl       High               200-499 mg/dl       Very High          >499 mg/dl  Cholesterol:         Desirable        <200 mg/dl      Borderline High  200-239 mg/dl      High             >239 mg/dl  HDL Cholesterol:        High    >59 mg/dL      Low     <41 mg/dL     Test Name Result Flag Reference   CHOLESTEROL 189 mg/dL     HDL,DIRECT 53 mg/dL  40-60   LDL CHOLESTEROL CALCULATED 115 mg/dL H 0-100   TRIGLYCERIDES 107 mg/dL  <=150     (1) TSH [id number] 03:33PM Meri Hanley   Patients undergoing fluorescein dye angiography may retain small amounts of fluorescein in the body for 48-72 hours post procedure  Samples containing fluorescein can produce falsely depressed TSH values  If the patient had this procedure,a specimen should be resubmitted post fluorescein clearance  Test Name Result Flag Reference   TSH 2 110 uIU/mL  0 358-3 740       Plan  Leukopenia    · (1) ACUTE HEPATITIS PANEL; Status:Active; Requested for:15Mar2016;    · (1) CBC/PLT/DIFF; Status:Active; Requested for:19Apr2016;    · (1) FOLATE; Status:Active; Requested for:15Mar2016;    · (1) HIV AG/AB COMBO, 4TH GEN; [Do Not Release]; Status:Active; Requested  for:15Mar2016;    · (1) LD (LDH); Status:Active; Requested for:15Mar2016;    · (1) URIC ACID; Status:Active; Requested for:15Mar2016;    · (1) VITAMIN B12; Status:Active; Requested for:15Mar2016;     Discussion/Summary   Lab work was fairly normal except the white blood cell count is a little low  I would recommend we recheck that in one month  We'll check a couple additional lab tests as well

## 2018-01-15 NOTE — RESULT NOTES
Verified Results  (1) HEMOGLOBIN A1C [id number] 03:33PM Billie Wells   5 7-6 4% impaired fasting glucose  >=6 5% diagnosis of diabetes    Falsely low levels are seen in conditions linked to short RBC life span-  hemolytic anemia, and splenomegaly  Falsely elevated levels are seen in situations where there is an increased production of RBC- receipt of erythropoietin or blood transfusions  Adopted from ADA-Clinical Practice Recommendations     Test Name Result Flag Reference   HEMOGLOBIN A1C 5 9 % H 4 0-5 6   EST  AVG  GLUCOSE 123 mg/dl         Discussion/Summary   Lab work was all okay  Not diabetic  Cholesterol was pretty good  Continue diet and exercise  Recheck in a year

## 2018-01-15 NOTE — RESULT NOTES
Verified Results  (1) URIC ACID [id number] 03:23PM [facility] Order Number: [id number]   Order Number: [id number]     Test Name Result Flag Reference   URIC ACID 4 8 mg/dL  4 2-8 0   Specimen collection should occur prior to Metamizole administration due to the potential for falsely depressed results       (1) LD (LDH) [id number] 03:23PM [facility] Order Number: [id number]   Order Number: [id number]     Test Name Result Flag Reference   LD (LDH) 155 U/L       (1) CBC/PLT/DIFF [id number] 03:23PM [facility] Order Number: [id number]   Order Number: [id number]     Test Name Result Flag Reference   WBC COUNT 4 94 Thousand/uL  4 31-10 16   RBC COUNT 4 83 Million/uL  3 88-5 62   HEMOGLOBIN 14 0 g/dL  12 0-17 0   HEMATOCRIT 41 4 %  36 5-49 3   MCV 86 fL  82-98   MCH 29 0 pg  26 8-34 3   MCHC 33 8 g/dL  31 4-37 4   RDW 13 5 %  11 6-15 1   MPV 11 5 fL  8 9-12 7   PLATELET COUNT 258 Thousands/uL  149-390   nRBC AUTOMATED 0 /100 WBCs     NEUTROPHILS RELATIVE PERCENT 40 % L 43-75   LYMPHOCYTES RELATIVE PERCENT 42 %  14-44   MONOCYTES RELATIVE PERCENT 11 %  4-12   EOSINOPHILS RELATIVE PERCENT 6 %  0-6   BASOPHILS RELATIVE PERCENT 1 %  0-1   NEUTROPHILS ABSOLUTE COUNT 1 99 Thousands/?L  1 85-7 62   LYMPHOCYTES ABSOLUTE COUNT 2 06 Thousands/?L  0 60-4 47   MONOCYTES ABSOLUTE COUNT 0 52 Thousand/?L  0 17-1 22   EOSINOPHILS ABSOLUTE COUNT 0 29 Thousand/?L  0 00-0 61   BASOPHILS ABSOLUTE COUNT 0 06 Thousands/?L  0 00-0 10       Discussion/Summary   white blood count now normal   recheck in 6 months

## 2018-01-17 NOTE — PROGRESS NOTES
Assessment    1  Encounter for preventive health examination (V70 0) (Z00 00)   2  Screening for diabetes mellitus (V77 1) (Z13 1)   3  Screening for hyperlipidemia (V77 91) (Z13 220)    Plan  Health Maintenance    · Call [phone] if: You have any warning signs of skin cancer ; Status:Complete;    Done: 25DWA1048 03:28PM   · Always use a seat belt and shoulder strap when riding or driving a motor vehicle ;  Status:Complete;   Done: 21YAR7777 03:28PM   · Begin a limited exercise program ; Status:Complete;   Done: 57STX2696 03:28PM   · Brush your teeth 3 times a day and floss at least once a day ; Status:Complete;   Done:  13CTN2952 03:28PM   · Eat a low fat and low cholesterol diet ; Status:Complete;   Done: 83EEO5873 03:28PM   · Stretch and warm up your muscles during the first 10 minutes , then cool down your  muscles for the last 10 minutes of exercise ; Status:Complete;   Done: 89BDP4417  03:28PM   · Use a sun block product with an SPF of 15 or more ; Status:Complete;   Done:  66IMN0140 03:28PM   · We encourage all of our patients to exercise regularly  30 minutes of exercise or physical  activity five or more days a week is recommended for children and adults ;  Status:Complete;   Done: 08GUQ3951 03:28PM   · We recommend routine visits to a dentist ; Status:Complete;   Done: 60HYF3565 03:28PM  Screening for diabetes mellitus, Screening for hyperlipidemia    · (1) CBC/PLT/DIFF; Status:Active; Requested for:14Mar2016;    · (1) CBC/PLT/DIFF; Status:Canceled;    · (1) COMPREHENSIVE METABOLIC PANEL; Status:Active; Requested for:14Mar2016;    · (1) COMPREHENSIVE METABOLIC PANEL; Status:Canceled;    · (1) HEMOGLOBIN A1C; Status:Active; Requested for:14Mar2016;    · (1) HEMOGLOBIN A1C; Status:Canceled;    · (1) LIPID PANEL, FASTING; Status:Active; Requested for:14Mar2016;    · (1) LIPID PANEL, FASTING; Status:Canceled;    · (1) TSH; Status:Active;  Requested for:14Mar2016;    · (1) TSH; Status:Canceled; Screening for hyperlipidemia    · Routine Venipuncture - POC; Status:Active; Requested for:14Mar2016;      Lab work drawn in the office today  Staff will contact him once we have the results  Based on that, I will decide whether any to see him in 6 months or 1 year  Chief Complaint  WELL ADULT      History of Present Illness  HM, Adult Male: The patient is being seen for a Patient is a 62-year-old male presented to the office for general checkup  His father developed diabetes at age 43, and he is concerned about that possibility  evaluation  General Health: The patient's health since the last visit is described as good   Recently had a vasectomy, , saw Dr Danita White of dermatology  Also recently had a arthroscopic surgery on [facility] left [facility]  He has regular dental visits  He denies vision problems  He denies hearing loss  Lifestyle:  He does not have a healthy diet  He does not have any weight concerns  He exercises regularly  He does not use tobacco  He denies alcohol use  He denies drug use  Reproductive health:  the patient is sexually active  Screening: cancer screening reviewed and updated  metabolic screening reviewed and updated  risk screening reviewed and updated  Review of Systems    Constitutional: No fever or chills, feels well, no tiredness, no recent weight gain or weight loss  Eyes: No complaints of eye pain, no red eyes, no discharge from eyes, no itchy eyes  ENT: no complaints of earache, no hearing loss, no nosebleeds, no nasal discharge, no sore throat, no hoarseness  Cardiovascular: No complaints of slow heart rate, no fast heart rate, no chest pain, no palpitations, no leg claudication, no lower extremity  Respiratory: No complaints of shortness of breath, no wheezing, no cough, no SOB on exertion, no orthopnea or PND     Gastrointestinal: No complaints of abdominal pain, no constipation, no nausea or vomiting, no diarrhea or bloody stools  Genitourinary: No complaints of dysuria, no incontinence, no hesitancy, no nocturia, no genital lesion, no testicular pain  Musculoskeletal: No complaints of arthralgia, no myalgias, no joint swelling or stiffness, no limb pain or swelling  Integumentary: No complaints of skin rash or skin lesions, no itching, no skin wound, no dry skin  Neurological: No compliants of headache, no confusion, no convulsions, no numbness or tingling, no dizziness or fainting, no limb weakness, no difficulty walking  Psychiatric: Is not suicidal, no sleep disturbances, no anxiety or depression, no change in personality, no emotional problems  Endocrine: No complaints of proptosis, no hot flashes, no muscle weakness, no erectile dysfunction, no deepening of the voice, no feelings of weakness  Hematologic/Lymphatic: No complaints of swollen glands, no swollen glands in the neck, does not bleed easily, no easy bruising  Active Problems    1  Encounter for sterilization (V25 2) (Z30 2)    Past Medical History    · History of viral gastroenteritis (V12 09) (Z86 19)    Surgical History    · History of Knee Arthroscopy With Medial And Lateral Meniscus Repair   · History of Surgery Vas Deferens Vasectomy    Family History    · Family history of Diabetes   · Family history of diabetes mellitus (V18 0) (Z83 3)    · Family history of Cancer    · Family history of Cancer   · Family history of lung cancer (V16 1) (Z80 1)   · Family history of Stroke    Social History    · Alcoholism in recovery (303 90) (F10 20)   · Denied: History of Drug use   · Never A Smoker   · Non-smoker (V49 89) (Z78 9)   · Uses Safety Equipment - Seatbelts    Current Meds   1  No Reported Medications Recorded    Allergies    1   No Known Drug Allergies    Vitals   Recorded: 24MGB0570 88:24LP   Systolic 692   Diastolic 82   Weight 906 lb 8 oz   BMI Calculated 28 05   BSA Calculated 2 26     Physical Exam    Constitutional   General appearance: No acute distress, well appearing and well nourished  Eyes   Conjunctiva and lids: No erythema, swelling or discharge  Pupils and irises: Equal, round, reactive to light  Ophthalmoscopic examination: Normal fundi and optic discs  Ears, Nose, Mouth, and Throat   External inspection of ears and nose: Normal     Otoscopic examination: Tympanic membranes translucent with normal light reflex  Canals patent without erythema  Hearing: Normal     Nasal mucosa, septum, and turbinates: Normal without edema or erythema  Lips, teeth, and gums: Normal, good dentition  Oropharynx: Normal with no erythema, edema, exudate or lesions  Neck   Neck: Supple, symmetric, trachea midline, no masses  Thyroid: Normal, no thyromegaly  Pulmonary   Respiratory effort: No increased work of breathing or signs of respiratory distress  Percussion of chest: Normal     Palpation of chest: Normal     Auscultation of lungs: Clear to auscultation  Cardiovascular   Palpation of heart: Normal PMI, no thrills  Auscultation of heart: Normal rate and rhythm, normal S1 and S2, no murmurs  Carotid pulses: 2+ bilaterally  Abdominal aorta: Normal     Femoral pulses: 2+ bilaterally  Pedal pulses: 2+ bilaterally  Examination of extremities for edema and/or varicosities: Normal     Chest   Breasts: Normal, no dimpling or skin changes appreciated  Palpation of breasts and axillae: Normal, no masses palpated  Chest: Normal     Abdomen   Abdomen: Non-tender, no masses  Liver and spleen: No hepatomegaly or splenomegaly  Examination for hernias: No hernias appreciated  Anus, perineum, and rectum: Normal sphincter tone, no masses, no prolapse  Stool sample for occult blood: Negative  Genitourinary   Scrotal contents: Normal testes, no masses  Penis: Normal, no lesions  Digital rectal exam of prostate: Normal size, no masses  Lymphatic   Palpation of lymph nodes in neck: No lymphadenopathy  Palpation of lymph nodes in axillae: No lymphadenopathy  Palpation of lymph nodes in groin: No lymphadenopathy  Palpation of lymph nodes in other areas: No lymphadenopathy  Musculoskeletal   Gait and station: Normal     Inspection/palpation of digits and nails: Normal without clubbing or cyanosis  Inspection/palpation of joints, bones, and muscles: Normal     Range of motion: Normal     Stability: Normal     Muscle strength/tone: Normal     Skin   Skin and subcutaneous tissue: Normal without rashes or lesions  Palpation of skin and subcutaneous tissue: Normal turgor  Neurologic   Cranial nerves: Cranial nerves 2-12 intact  Reflexes: 2+ and symmetric  Sensation: No sensory loss  Psychiatric   Judgment and insight: Normal     Orientation to person, place and time: Normal     Recent and remote memory: Intact      Mood and affect: Normal        Signatures   Electronically signed by : Baltazar Bailey DO; Mar 14 2016  3:29PM EST                       (Author)

## 2018-01-22 VITALS
HEART RATE: 79 BPM | SYSTOLIC BLOOD PRESSURE: 120 MMHG | DIASTOLIC BLOOD PRESSURE: 78 MMHG | WEIGHT: 214.06 LBS | HEIGHT: 74 IN | BODY MASS INDEX: 27.47 KG/M2 | OXYGEN SATURATION: 98 %

## 2018-01-22 VITALS
BODY MASS INDEX: 26.33 KG/M2 | SYSTOLIC BLOOD PRESSURE: 128 MMHG | DIASTOLIC BLOOD PRESSURE: 82 MMHG | HEIGHT: 74 IN | HEART RATE: 69 BPM | WEIGHT: 205.19 LBS

## 2018-01-23 ENCOUNTER — ALLSCRIPTS OFFICE VISIT (OUTPATIENT)
Dept: OTHER | Facility: OTHER | Age: 43
End: 2018-01-23

## 2018-01-23 ENCOUNTER — TRANSCRIBE ORDERS (OUTPATIENT)
Dept: ADMINISTRATIVE | Facility: HOSPITAL | Age: 43
End: 2018-01-23

## 2018-01-23 DIAGNOSIS — I21.9 ACUTE MYOCARDIAL INFARCTION (HCC): ICD-10-CM

## 2018-01-23 DIAGNOSIS — E78.5 HYPERLIPIDEMIA: ICD-10-CM

## 2018-01-23 DIAGNOSIS — I51.4 MYOCARDITIS (HCC): ICD-10-CM

## 2018-01-23 DIAGNOSIS — I51.4: Primary | ICD-10-CM

## 2018-01-24 DIAGNOSIS — I15.9 SECONDARY HYPERTENSION: Primary | ICD-10-CM

## 2018-01-24 RX ORDER — METOPROLOL SUCCINATE 25 MG/1
12.5 TABLET, EXTENDED RELEASE ORAL
Qty: 30 TABLET | Refills: 0 | Status: SHIPPED | OUTPATIENT
Start: 2018-01-24 | End: 2018-02-05 | Stop reason: SDUPTHER

## 2018-01-24 NOTE — PROGRESS NOTES
Plan   Anxiety, generalized    · DULoxetine HCl - 30 MG Oral Capsule Delayed Release Particles; take 1    capsule by mouth daily   Rx By: Kristine Rangel; Dispense: 30 Days ; #:30 Capsule Delayed Release Particles; Refill: 5;For: Anxiety, generalized; GRACE = N; Verified Transmission to [facility]/PHARMACY #[id number] Last Updated By: System, [employer]; 1/23/2018 10:28:46 AM  First myocardial infarction    · (1) HEMOGLOBIN A1C; Status:Active; Requested [id number]; Perform:[facility]; SCO:[id number]; For:First myocardial infarction; Ordered By:Curtis Xiao;  Health Maintenance    · Ibuprofen 800 MG Oral Tablet; TAKE 1 TABLET Every 8 hours   Rx By: Yolis Darnell; Dispense: 30 Days ; #:90 Tablet; Refill: 5;For: Health Maintenance; GRACE = N; Sent To: [facility]/PHARMACY #[id number]  Hyperlipidemia    · (1) LIPID PANEL FASTING W DIRECT LDL REFLEX; Status:Active; Requested    BRF:[id number]; Perform:[facility]; LXY:[id number];SWOJZQU;KYT:[id number]; Ordered By:Curtis Xiao;  Myocarditis    · Colchicine 0 6 MG Oral Tablet; take 1 tablet by mouth twice daily   Rx By: oYlis Darnell; Dispense: 30 Days ; #:60 Tablet; Refill: 5;For: Myocarditis; GRACE = N; Sent To: [facility]/PHARMACY #[id number]  · (1) CBC/ PLT (NO DIFF); Status:Active; Requested [id number]; Perform:[facility]; SDB:[id number];HADCNTC; For:Myocarditis; Ordered By:Curtis Xiao;   · (1) COMPREHENSIVE METABOLIC PANEL; Status:Active; Requested [id number]; Perform:[facility]; UFJ:[id number];[id number]; For:Myocarditis; Ordered By:Curtis Xiao;   · ECHO COMPLETE WITH CONTRAST IF INDICATED; Status:Need Information - Financial    Authorization; Requested JAF:[id number]; Perform:[facility]; IQV:[id number];AGHKICM; For:Myocarditis; Ordered By:Curtis Xiao;    Discussion/Summary   Cardiology Discussion Summary Free Text Note Form [facility]:    # +troponin: Possible myocarditis/myopericarditis with normal LVEF by TTE  Clean coronaries on cardiac cath  TTE w/o focal WMA  cMRI is pending for evaluation of EF, scar and/or edema  Euvolemic on exam  Symptomatically appears to be improving  cMRI shows LVEF --45-50% w/ top normal RV size, but mildly reduced function  No e/o scar, inflammation, or infiltrative disease  Continues to have pericarditis symptoms  reduction in function we will do the following:  TTE Chem 10, A1c, lipid panel   Nitro prn metoprolol succinate 12 5 mg PO BID losartan 25 mg PO daily ibuprofen 800 mg PO q8hrs, continue colchicine; will continue until symptoms have resolved, then will stop ibuprofen symptoms have resolved x 2 weeks, then back to full activity  Until that time, limit activity based on chest discomfort  duloxetine   GERD: Continue prilosec Anxiety: Patient has a component of anxiety as well related to his recent diagnosis  States it is improving as well   in 3 months  Chief Complaint   Chief Complaint Free Text Note Form: F/u      History of Present Illness   Cardiology HPI Free Text Note Form  Harriet Crawford: Very pleasant 38year old gentleman w/ no PMHx p/w acute onset retrosternal pressure to [facility] on 7/27/17  Zofran and viscous lidocaine given for nausea/emesis and possible reflux, respectively without resolution  Trop trend 0 00->0 25->11 4  S/p cardiac cath with no coronary disease  Symptoms likely from myocarditis +/- myopericarditis vs vasospasm  ECG ok  Seen by Dr Wendy Hines in f/u  The patient was started on prilosec for GERD with improvement in throat tightness and switched from lisinopril to losartan     states that he continues to feel a little bit of pressure in the chest when he leans backwards  He states his breathing was worse since discharge, so he was switched to losartan 25 mg PO daily  He is taking prilosec and thinks this may have helped a bit  He thinks it is getting better and better  He had his cMRI on 8/14/2017 w/ results pending     he returns for f/u, 9/21/2017, he states he continues to feel a little bit of pressure in the chest when he leans backwards  Last week he felt a little better, and then he worked hard at work, and it exacerbated it over the weekend  He sleeps sitting up due to the pain  he returns for f/u, 10/17/2017, he states he continues to feel a pressure in the chest when he leans backwards  He states that the severity is decreasing  He continues to sleep sitting up     he returns for f/u, 11/27/2017, he states the radiating pain is gone  He gets sharp pains with activity; he feels that it is in his ribs  When he lays down he the pressure in the chest is still there, but feels like it is improving  He fell asleep on his stomach  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 without symptoms  Energy improved  he returns for f/u, 1/23/2018, he states the radiating pain is gone  He gets sharp pains with activity but much less frequently now; he feels that it is in his ribs but markedly improved  When he lays down he still has pressure in the chest but significantly improved  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 without symptoms, but with strenuous activity he feels it again  Energy improved  Review of Systems   Cardiology Male ROS:         Cardiac: as noted in HPI  Skin: No complaints of nonhealing sores or skin rash  Genitourinary: No complaints of recurrent urinary tract infections, frequent urination at night, difficult urination, blood in urine, kidney stones, loss of bladder control, no kidney or prostate problems, no erectile dysfunction  Psychological: No complaints of feeling depressed, anxiety, panic attacks, or difficulty concentrating  General: No complaints of trouble sleeping, lack of energy, fatigue, appetite changes, weight changes, fever, frequent infections, or night sweats  Respiratory: No complaints of shortness of breath, cough with sputum, or wheezing        HEENT: No complaints of serious problems, hearing problems, nose problems, throat problems, or snoring  Gastrointestinal: No complaints of liver problems, nausea, vomiting, heartburn, constipation, bloody stools, diarrhea, problems swallowing, adbominal pain, or rectal bleeding  Hematologic: No complaints of bleeding disorders, anemia, blood clots, or excessive brusing  Neurological: No complaints of numbness, tingling, dizziness, weakness, seizures, headaches, syncope or fainting, AM fatigue, daytime sleepiness, no witnessed apnea episodes  Musculoskeletal: No complaints of arthritis, back pain, or painfull swelling  ROS Reviewed:    ROS reviewed  Active Problems   Problems    1  Anxiety, generalized (300 02) (F41 1)   2  Costochondritis (733 6) (M94 0)   3  Hyperlipidemia (272 4) (E78 5)   4  Lead-induced acute gout, 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,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,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)   5  Myocarditis (429 0) (I51 4)    Past Medical History   Problems    1  History of Encounter for sterilization (V25 2) (Z30 2)   2  History of dermatitis (V13 3) (Z87 2)   3  History of scrotal mass (V13 89) (Z87 438)   4  History of viral gastroenteritis (V12 09) (Z86 19)   5  History of Leukopenia (288 50) (D72 819)   6  History of Right-sided low back pain without sciatica (724 2) (M54 5)  Active Problems And Past Medical History Reviewed: The active problems and past medical history were reviewed and updated today  Surgical History   Problems    1  History of Knee Arthroscopy With Medial And Lateral Meniscus Repair   2  History of Surgery Vas Deferens Vasectomy  Surgical History Reviewed: The surgical history was reviewed and updated today  Family History   Father    1  Family history of Diabetes   2  Family history of diabetes mellitus (V18 0) (Z83 3)  Sister    3  Family history of Cancer  Brother    4  Family history of Cancer   5  Family history of lung cancer (V16 1) (Z80 1)   6   Family history of Stroke  Family History Reviewed: The family history was reviewed and updated today  Social History   Problems    · Alcoholism in recovery (303 90) (F10 20)   · Denied: History of Drug use   · Never A Smoker   · Non-smoker (V49 89) (Z78 9)   · Uses Safety Equipment - Seatbelts  Social History Reviewed: The social history was reviewed and updated today  The social history was reviewed and is unchanged  Current Meds    1  Colchicine 0 6 MG Oral Capsule; Take 1 tablet twice daily; Therapy: 12Azc6659 to (Evaluate:23Jan2018)  Requested for: 26Aqq2275; Last     Rx:[id number] Ordered   2  Colchicine 0 6 MG Oral Tablet; take 1 tablet by mouth twice daily; Therapy: 53OVK3412 to (Evaluate:21Feb2018)  Requested for: 35DHR5257; Last     Rx:22Jan2018 Ordered   3  DULoxetine HCl - 30 MG Oral Capsule Delayed Release Particles; take 1 capsule by     mouth daily; Therapy: 17Qtm4064 to (Evaluate:36Uar5101)  Requested for: 93PZC0712; Last     Rx:24Nov2017 Ordered   4  Ibuprofen 800 MG Oral Tablet; TAKE 1 TABLET Every 8 hours  Requested for:     87Lih8620; Last Rx:[id number] Ordered   5  Losartan Potassium 25 MG Oral Tablet; TAKE 1 TABLET DAILY AS DIRECTED; Therapy: 15Eeb5927 to (Evaluate:04Feb2018)  Requested for: 72Avs6520; Last     Rx:[id number] Ordered   6  Metoprolol Succinate ER 25 MG Oral Tablet Extended Release 24 Hour; TAKE ONE     TABLET BY MOUTH DAILY AT BEDTIME; Therapy: 58VBH9309 to Recorded   7  PriLOSEC OTC 20 MG Oral Tablet Delayed Release; TAKE 1 TABLET DAILY; Therapy: 58JSB4182 to Recorded  Medication List Reviewed: The medication list was reviewed and updated today  Allergies   Medication    1   No Known Drug Allergies    Vitals   Vital Signs    Recorded: 87SCZ9687 04:09PM   Heart Rate 77   Systolic 442, RUE, Sitting   Diastolic 80, RUE, Sitting   Height 6 ft 2 in   Weight 222 lb    BMI Calculated 28 5   BSA Calculated 2 27   O2 Saturation 99     Physical Exam        Constitutional      General appearance: No acute distress, well appearing and well nourished  Eyes      Conjunctiva and Sclera examination: Conjunctiva pink, sclera anicteric  Ears, Nose, Mouth, and Throat - Oropharynx: Clear, nares are clear, mucous membranes are moist       Neck      Neck and thyroid: Normal, supple, trachea midline, no thyromegaly  Pulmonary      Respiratory effort: No increased work of breathing or signs of respiratory distress  Auscultation of lungs: Clear to auscultation, no rales, no rhonchi, no wheezing, good air movement  Cardiovascular      Auscultation of heart: Normal rate and rhythm, normal S1 and S2, no murmurs  Carotid pulses: Normal, 2+ bilaterally  Peripheral vascular exam: Normal pulses throughout, no tenderness, erythema or swelling  Pedal pulses: Normal, 2+ bilaterally  Examination of extremities for edema and/or varicosities: Normal        Abdomen      Abdomen: Non-tender and no distention  Liver and spleen: No hepatomegaly or splenomegaly  Musculoskeletal Gait and station: Normal gait  -- Digits and nails: Normal without clubbing or cyanosis  -- Inspection/palpation of joints, bones, and muscles: Normal, ROM normal        Skin - Skin and subcutaneous tissue: Normal without rashes or lesions  Skin is warm and well perfused, normal turgor  Neurologic - Cranial nerves: II - XII intact  -- Speech: Normal        Psychiatric - Orientation to person, place, and time: Normal -- Mood and affect: Normal       Results/Data   Diagnostic Studies Reviewed Cardio: I personally reviewed the recording/images in the office today  My interpretation follows  Signatures    Electronically signed by : TAYLA Gonzalez  Jan 23 2018  4:26PM EST                       (Author)

## 2018-02-01 ENCOUNTER — HOSPITAL ENCOUNTER (OUTPATIENT)
Dept: NON INVASIVE DIAGNOSTICS | Facility: CLINIC | Age: 43
Discharge: HOME | End: 2018-02-01
Payer: COMMERCIAL

## 2018-02-01 DIAGNOSIS — I51.4: ICD-10-CM

## 2018-02-01 PROCEDURE — 93306 TTE W/DOPPLER COMPLETE: CPT

## 2018-02-01 PROCEDURE — 93306 TTE W/DOPPLER COMPLETE: CPT | Performed by: INTERNAL MEDICINE

## 2018-02-03 ENCOUNTER — APPOINTMENT (OUTPATIENT)
Dept: LAB | Facility: MEDICAL CENTER | Age: 43
End: 2018-02-03
Payer: COMMERCIAL

## 2018-02-03 DIAGNOSIS — I21.9 ACUTE MYOCARDIAL INFARCTION (HCC): ICD-10-CM

## 2018-02-03 DIAGNOSIS — I51.4 MYOCARDITIS (HCC): ICD-10-CM

## 2018-02-03 DIAGNOSIS — E78.5 HYPERLIPIDEMIA: ICD-10-CM

## 2018-02-03 LAB
ALBUMIN SERPL BCP-MCNC: 4 G/DL (ref 3.5–5)
ALP SERPL-CCNC: 72 U/L (ref 46–116)
ALT SERPL W P-5'-P-CCNC: 43 U/L (ref 12–78)
ANION GAP SERPL CALCULATED.3IONS-SCNC: 5 MMOL/L (ref 4–13)
AST SERPL W P-5'-P-CCNC: 22 U/L (ref 5–45)
BILIRUB SERPL-MCNC: 0.55 MG/DL (ref 0.2–1)
BUN SERPL-MCNC: 11 MG/DL (ref 5–25)
CALCIUM SERPL-MCNC: 9 MG/DL (ref 8.3–10.1)
CHLORIDE SERPL-SCNC: 104 MMOL/L (ref 100–108)
CHOLEST SERPL-MCNC: 199 MG/DL (ref 50–200)
CO2 SERPL-SCNC: 31 MMOL/L (ref 21–32)
CREAT SERPL-MCNC: 0.92 MG/DL (ref 0.6–1.3)
ERYTHROCYTE [DISTWIDTH] IN BLOOD BY AUTOMATED COUNT: 13.1 % (ref 11.6–15.1)
EST. AVERAGE GLUCOSE BLD GHB EST-MCNC: 128 MG/DL
GFR SERPL CREATININE-BSD FRML MDRD: 104 ML/MIN/1.73SQ M
GLUCOSE P FAST SERPL-MCNC: 94 MG/DL (ref 65–99)
HBA1C MFR BLD: 6.1 % (ref 4.2–6.3)
HCT VFR BLD AUTO: 41.3 % (ref 36.5–49.3)
HDLC SERPL-MCNC: 46 MG/DL (ref 40–60)
HGB BLD-MCNC: 13.6 G/DL (ref 12–17)
LDLC SERPL CALC-MCNC: 138 MG/DL (ref 0–100)
MCH RBC QN AUTO: 28.9 PG (ref 26.8–34.3)
MCHC RBC AUTO-ENTMCNC: 32.9 G/DL (ref 31.4–37.4)
MCV RBC AUTO: 88 FL (ref 82–98)
PLATELET # BLD AUTO: 352 THOUSANDS/UL (ref 149–390)
PMV BLD AUTO: 11.4 FL (ref 8.9–12.7)
POTASSIUM SERPL-SCNC: 4.5 MMOL/L (ref 3.5–5.3)
PROT SERPL-MCNC: 7.7 G/DL (ref 6.4–8.2)
RBC # BLD AUTO: 4.7 MILLION/UL (ref 3.88–5.62)
SODIUM SERPL-SCNC: 140 MMOL/L (ref 136–145)
TRIGL SERPL-MCNC: 77 MG/DL
WBC # BLD AUTO: 3.43 THOUSAND/UL (ref 4.31–10.16)

## 2018-02-03 PROCEDURE — 36415 COLL VENOUS BLD VENIPUNCTURE: CPT

## 2018-02-03 PROCEDURE — 80061 LIPID PANEL: CPT

## 2018-02-03 PROCEDURE — 80053 COMPREHEN METABOLIC PANEL: CPT

## 2018-02-03 PROCEDURE — 83036 HEMOGLOBIN GLYCOSYLATED A1C: CPT

## 2018-02-03 PROCEDURE — 85027 COMPLETE CBC AUTOMATED: CPT

## 2018-02-05 DIAGNOSIS — I15.9 SECONDARY HYPERTENSION: ICD-10-CM

## 2018-02-05 DIAGNOSIS — I10 BENIGN HYPERTENSION: Primary | ICD-10-CM

## 2018-02-05 RX ORDER — METOPROLOL SUCCINATE 25 MG/1
12.5 TABLET, EXTENDED RELEASE ORAL
Qty: 30 TABLET | Refills: 0 | Status: SHIPPED | OUTPATIENT
Start: 2018-02-05 | End: 2018-08-06 | Stop reason: SDUPTHER

## 2018-02-05 RX ORDER — LISINOPRIL 5 MG/1
5 TABLET ORAL DAILY
Qty: 30 TABLET | Refills: 3 | Status: SHIPPED | OUTPATIENT
Start: 2018-02-05 | End: 2018-05-16 | Stop reason: HOSPADM

## 2018-02-08 ENCOUNTER — TELEPHONE (OUTPATIENT)
Dept: CARDIOLOGY CLINIC | Facility: CLINIC | Age: 43
End: 2018-02-08

## 2018-02-09 NOTE — TELEPHONE ENCOUNTER
We discussed that TTE is slightly improved from prior cMRI  We also discussed his elevated LDL with plan to improve diet and eventually exercise (once pericarditis has resolved)

## 2018-02-13 ENCOUNTER — TELEPHONE (OUTPATIENT)
Dept: FAMILY MEDICINE CLINIC | Facility: CLINIC | Age: 43
End: 2018-02-13

## 2018-02-13 NOTE — TELEPHONE ENCOUNTER
I agree with Cardiology  With his bad cholesterol being as high it is is he would benefit from taking a statin drug  Would recommend either atorvastatin or rosuvastatin

## 2018-02-14 ENCOUNTER — TELEPHONE (OUTPATIENT)
Dept: CARDIOLOGY CLINIC | Facility: CLINIC | Age: 43
End: 2018-02-14

## 2018-02-21 NOTE — TELEPHONE ENCOUNTER
I would like to refer the patient to the pericardial diseases center at [facility] for evaluation and treatment of long standing myopericarditis  The number is [phone]  I called the patient and left a message

## 2018-04-02 DIAGNOSIS — I10 HYPERTENSION, UNSPECIFIED TYPE: Primary | ICD-10-CM

## 2018-04-02 RX ORDER — LOSARTAN POTASSIUM 25 MG/1
1 TABLET ORAL DAILY
COMMUNITY
Start: 2017-08-08 | End: 2018-04-02 | Stop reason: SDUPTHER

## 2018-04-02 RX ORDER — LOSARTAN POTASSIUM 25 MG/1
25 TABLET ORAL DAILY
Qty: 90 TABLET | Refills: 3 | Status: SHIPPED | OUTPATIENT
Start: 2018-04-02 | End: 2018-06-14 | Stop reason: SDUPTHER

## 2018-05-15 NOTE — PROGRESS NOTES
Heart Failure Outpatient Progress Note - Silvio Dominguez 41 y o  male MRN: [id number]    @ Encounter: [id number]      Assessment/Plan:    Patient Active Problem List    Diagnosis Date Noted    Elevated troponin 07/27/2017     # Myopericarditis with recovered LVEF by TTE w/ mildly reduced RVSF  Clean coronaries on cardiac cath  TTE w/o focal WMA  Euvolemic on exam  Symptomatically appears to be improving  cMRI shows LVEF --45-50% w/ top normal RV size, but mildly reduced function  No e/o scar, inflammation, or infiltrative disease  Pericarditis symptoms resolved x ~1 month now  LVEF has normalized, but RVSF still reduced as noted below  Due to reduction in function we will do the following:   Diag:  --TTE 2/1/18 shows LVEF>55%, grade 1 diastology, mild RV dilation, grade 1 diastology, no sig valvular disease  --CBC, Chem 10 and A1c 6 1 all ok  Lipid panel shows elevated LDL ~138 w/   --Repeat TTE once off pericarditis treatment    Therapeutic:   --Continue metoprolol succinate 12 5 mg PO BID   --Continue losartan 25 mg PO daily   --Stop ibuprofen 800 mg PO q8hrs as symptoms have resolved; continue colchicine x 1 month then reassess  --Back to full activity as tolerated  --Continue duloxetine      # GERD: Continue prilosec x 6 weeks  # Anxiety: Patient has a component of anxiety as well related to his recent diagnosis  States it is improving as well   # HLD    RTC in 1 month    HPI: Very pleasant 41year old gentleman w/ no PMHx p/w acute onset retrosternal pressure to [facility] on 7/27/17  Zofran and viscous lidocaine given for nausea/emesis and possible reflux, respectively without resolution  Trop trend 0 00->0 25->11 4  S/p cardiac cath with no coronary disease  Symptoms likely from myocarditis +/- myopericarditis vs vasospasm  ECG ok  Seen by Dr Quang Guerra in f/u  The patient was started on prilosec for GERD with improvement in throat tightness and switched from lisinopril to losartan        He states that he continues to feel a little bit of pressure in the chest when he leans backwards  He states his breathing was worse since discharge, so he was switched to losartan 25 mg PO daily  He is taking prilosec and thinks this may have helped a bit  He thinks it is getting better and better  He had his cMRI on 8/14/2017 w/ results pending  Today he returns for f/u, 9/21/2017, he states he continues to feel a little bit of pressure in the chest when he leans backwards  Last week he felt a little better, and then he worked hard at work, and it exacerbated it over the weekend  He sleeps sitting up due to the pain  Today he returns for f/u, 10/17/2017, he states he continues to feel a pressure in the chest when he leans backwards  He states that the severity is decreasing  He continues to sleep sitting up  Today he returns for f/u, 11/27/2017, he states the radiating pain is gone  He gets sharp pains with activity; he feels that it is in his ribs  When he lays down he the pressure in the chest is still there, but feels like it is improving  He fell asleep on his stomach  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 without symptoms  Energy improved  Today he returns for f/u, 1/23/2018, he states the radiating pain is gone  He gets sharp pains with activity but much less frequently now; he feels that it is in his ribs but markedly improved  When he lays down he still has pressure in the chest but significantly improved  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 without symptoms, but with strenuous activity he feels it again  Energy improved  Today he returns for f/u, 5/16/18  He did not go to [facility] as it was too far  He states that he still gets the R sided discomfort  He can sleep through the night fine, and occasionally has pain under the ribs  However, he sleeps on his stomach  Now when he lays flat on his back, the chest pains completely resolved   Now at work he is fine during work, but at night sometimes he feels a twinge  This is very rare  No past medical history on file  Review of Systems - 12 point ROS was done and is negative, except as noted above  No Known Allergies      Current Outpatient Prescriptions:     colchicine (COLCRYS) 0 6 mg tablet, Take 0 6 mg by mouth 2 (two) times a day, Disp: , Rfl: 5    DULoxetine (CYMBALTA) 30 mg delayed release capsule, Take 30 mg by mouth daily, Disp: , Rfl: 5    losartan (COZAAR) 25 mg tablet, Take 1 tablet (25 mg total) by mouth daily, Disp: 90 tablet, Rfl: 3    metoprolol succinate (TOPROL-XL) 25 mg 24 hr tablet, Take 0 5 tablets (12 5 mg total) by mouth daily at bedtime (Patient taking differently: Take 25 mg by mouth daily at bedtime  ), Disp: 30 tablet, Rfl: 0    omeprazole (PRILOSEC OTC) 20 MG tablet, Take 1 tablet by mouth daily, Disp: , Rfl:     Social History     Social History    Marital status: /Civil Union     Spouse name: N/A    Number of children: N/A    Years of education: N/A     Occupational History    Not on file  Social History Main Topics    Smoking status: Never Smoker    Smokeless tobacco: Not on file    Alcohol use Yes      Comment: weekends    Drug use: No    Sexual activity: Not on file     Other Topics Concern    Not on file     Social History Narrative    No narrative on file       No family history on file  Physical Exam:    Vitals: Blood pressure 110/80, pulse 84, weight 101 kg (223 lb), SpO2 96 %  , Body mass index is 28 63 kg/m² ,   Wt Readings from Last 3 Encounters:   05/16/18 101 kg (223 lb)   11/27/17 97 1 kg (214 lb 0 9 oz)   11/15/17 97 5 kg (215 lb)     Vitals:    05/16/18 1553 05/16/18 1557   BP:  110/80   BP Location:  Right arm   Patient Position:  Sitting   Cuff Size:  Standard   Pulse:  84   SpO2:  96%   Weight: 101 kg (223 lb) 101 kg (223 lb)       GEN: [employer] appears well, alert and oriented x 3, pleasant and cooperative   HEENT: pupils equal, round, and reactive to light; extraocular muscles intact  NECK: supple, no carotid bruits   HEART: regular rhythm, normal S1 and S2, no murmurs, clicks, gallops or rubs, JVD is flat    LUNGS: clear to auscultation bilaterally; no wheezes, rales, or rhonchi   ABDOMEN: normal bowel sounds, soft, no tenderness, no distention  EXTREMITIES: peripheral pulses normal; no clubbing, cyanosis, or edema  NEURO: no focal findings   SKIN: normal without suspicious lesions on exposed skin    Labs & Results:  Lab Results   Component Value Date    WBC 3 43 (L) 02/03/2018    HGB 13 6 02/03/2018    HCT 41 3 02/03/2018    MCV 88 02/03/2018     02/03/2018       Chemistry        Component Value Date/Time     02/03/2018 1111    K 4 5 02/03/2018 1111     02/03/2018 1111    CO2 31 02/03/2018 1111    BUN 11 02/03/2018 1111    CREATININE 0 92 02/03/2018 1111        Component Value Date/Time    CALCIUM 9 0 02/03/2018 1111    ALKPHOS 72 02/03/2018 1111    AST 22 02/03/2018 1111    ALT 43 02/03/2018 1111    BILITOT 0 55 02/03/2018 1111        EKG personally reviewed by Trey Sesay MD      Counseling / Coordination of Care  Total floor / unit time spent today 40 minutes  Greater than 50% of total time was spent with the patient and / or family counseling and / or coordination of care  A description of the counseling / coordination of care: 20 min  Thank you for the opportunity to participate in the care of this patient      Trey Sesay MD, PhD   Gogo Lombardo

## 2018-05-16 ENCOUNTER — OFFICE VISIT (OUTPATIENT)
Dept: CARDIOLOGY CLINIC | Facility: CLINIC | Age: 43
End: 2018-05-16
Payer: COMMERCIAL

## 2018-05-16 VITALS
BODY MASS INDEX: 28.63 KG/M2 | HEART RATE: 84 BPM | OXYGEN SATURATION: 96 % | DIASTOLIC BLOOD PRESSURE: 80 MMHG | WEIGHT: 223 LBS | SYSTOLIC BLOOD PRESSURE: 110 MMHG

## 2018-05-16 DIAGNOSIS — I31.9 MYOPERICARDITIS: Primary | ICD-10-CM

## 2018-05-16 PROCEDURE — 99243 OFF/OP CNSLTJ NEW/EST LOW 30: CPT | Performed by: INTERNAL MEDICINE

## 2018-05-16 RX ORDER — IBUPROFEN 800 MG/1
1 TABLET ORAL EVERY 8 HOURS
COMMUNITY
End: 2018-05-16 | Stop reason: ALTCHOICE

## 2018-05-16 RX ORDER — COLCHICINE 0.6 MG/1
0.6 TABLET ORAL 2 TIMES DAILY
Refills: 5 | COMMUNITY
Start: 2018-04-25 | End: 2018-06-25 | Stop reason: ALTCHOICE

## 2018-05-16 RX ORDER — OMEPRAZOLE 20 MG/1
1 TABLET, DELAYED RELEASE ORAL DAILY
COMMUNITY
Start: 2017-09-21 | End: 2018-08-24 | Stop reason: HOSPADM

## 2018-05-16 RX ORDER — DULOXETIN HYDROCHLORIDE 30 MG/1
30 CAPSULE, DELAYED RELEASE ORAL DAILY
Refills: 5 | COMMUNITY
Start: 2018-04-21 | End: 2018-07-21 | Stop reason: SDUPTHER

## 2018-05-16 NOTE — LETTER
May 16, 2018     Carrington Alex DO  [address]  [place]    Patient: Gaurav Curtis   YOB: 1975   Date of Visit: 5/16/2018       Dear Dr Rosibel Melton: Thank you for referring Gaurav Curtis to me for evaluation  Below are my notes for this consultation  If you have questions, please do not hesitate to call me  I look forward to following your patient along with you  Sincerely,        Yung Vaughn MD        CC: DO Yung Mcdaniel MD  5/16/2018  4:11 PM  Sign at close encounter    Heart Failure Outpatient Progress Note - Silvio Dominguez 41 y o  male MRN: [id number]    @ Encounter: [id number]      Assessment/Plan:    Patient Active Problem List    Diagnosis Date Noted    Elevated troponin 07/27/2017     # Myopericarditis with recovered LVEF by TTE w/ mildly reduced RVSF  Clean coronaries on cardiac cath  TTE w/o focal WMA  Euvolemic on exam  Symptomatically appears to be improving  cMRI shows LVEF --45-50% w/ top normal RV size, but mildly reduced function  No e/o scar, inflammation, or infiltrative disease  Pericarditis symptoms resolved x ~1 month now  LVEF has normalized, but RVSF still reduced as noted below  Due to reduction in function we will do the following:   Diag:  --TTE 2/1/18 shows LVEF>55%, grade 1 diastology, mild RV dilation, grade 1 diastology, no sig valvular disease  --CBC, Chem 10 and A1c 6 1 all ok  Lipid panel shows elevated LDL ~138 w/   --Repeat TTE once off pericarditis treatment    Therapeutic:   --Continue metoprolol succinate 12 5 mg PO BID   --Continue losartan 25 mg PO daily   --Stop ibuprofen 800 mg PO q8hrs as symptoms have resolved; continue colchicine x 1 month then reassess  --Back to full activity as tolerated  --Continue duloxetine      # GERD: Continue prilosec x 6 weeks  # Anxiety: Patient has a component of anxiety as well related to his recent diagnosis   States it is improving as well   # HLD    RTC in 1 month    HPI: Very pleasant 41year old gentleman w/ no PMHx p/w acute onset retrosternal pressure to [facility] on 7/27/17  Zofran and viscous lidocaine given for nausea/emesis and possible reflux, respectively without resolution  Trop trend 0 00->0 25->11 4  S/p cardiac cath with no coronary disease  Symptoms likely from myocarditis +/- myopericarditis vs vasospasm  ECG ok  Seen by Dr Yo Bella in f/u  The patient was started on prilosec for GERD with improvement in throat tightness and switched from lisinopril to losartan  He states that he continues to feel a little bit of pressure in the chest when he leans backwards  He states his breathing was worse since discharge, so he was switched to losartan 25 mg PO daily  He is taking prilosec and thinks this may have helped a bit  He thinks it is getting better and better  He had his cMRI on 8/14/2017 w/ results pending  Today he returns for f/u, 9/21/2017, he states he continues to feel a little bit of pressure in the chest when he leans backwards  Last week he felt a little better, and then he worked hard at work, and it exacerbated it over the weekend  He sleeps sitting up due to the pain  Today he returns for f/u, 10/17/2017, he states he continues to feel a pressure in the chest when he leans backwards  He states that the severity is decreasing  He continues to sleep sitting up  Today he returns for f/u, 11/27/2017, he states the radiating pain is gone  He gets sharp pains with activity; he feels that it is in his ribs  When he lays down he the pressure in the chest is still there, but feels like it is improving  He fell asleep on his stomach  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 without symptoms  Energy improved  Today he returns for f/u, 1/23/2018, he states the radiating pain is gone  He gets sharp pains with activity but much less frequently now; he feels that it is in his ribs but markedly improved   When he lays down he still has pressure in the chest but significantly improved  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 without symptoms, but with strenuous activity he feels it again  Energy improved  Today he returns for f/u, 5/16/18  He did not go to [facility] as it was too far  He states that he still gets the R sided discomfort  He can sleep through the night fine, and occasionally has pain under the ribs  However, he sleeps on his stomach  Now when he lays flat on his back, the chest pains completely resolved  Now at work he is fine during work, but at night sometimes he feels a twinge  This is very rare  No past medical history on file  Review of Systems - 12 point ROS was done and is negative, except as noted above  No Known Allergies      Current Outpatient Prescriptions:     colchicine (COLCRYS) 0 6 mg tablet, Take 0 6 mg by mouth 2 (two) times a day, Disp: , Rfl: 5    DULoxetine (CYMBALTA) 30 mg delayed release capsule, Take 30 mg by mouth daily, Disp: , Rfl: 5    losartan (COZAAR) 25 mg tablet, Take 1 tablet (25 mg total) by mouth daily, Disp: 90 tablet, Rfl: 3    metoprolol succinate (TOPROL-XL) 25 mg 24 hr tablet, Take 0 5 tablets (12 5 mg total) by mouth daily at bedtime (Patient taking differently: Take 25 mg by mouth daily at bedtime  ), Disp: 30 tablet, Rfl: 0    omeprazole (PRILOSEC OTC) 20 MG tablet, Take 1 tablet by mouth daily, Disp: , Rfl:     Social History     Social History    Marital status: /Civil Union     Spouse name: N/A    Number of children: N/A    Years of education: N/A     Occupational History    Not on file  Social History Main Topics    Smoking status: Never Smoker    Smokeless tobacco: Not on file    Alcohol use Yes      Comment: weekends    Drug use: No    Sexual activity: Not on file     Other Topics Concern    Not on file     Social History Narrative    No narrative on file       No family history on file      Physical Exam:    Vitals: Blood pressure 110/80, pulse 84, weight 101 kg (223 lb), SpO2 96 %  , Body mass index is 28 63 kg/m² ,   Wt Readings from Last 3 Encounters:   05/16/18 101 kg (223 lb)   11/27/17 97 1 kg (214 lb 0 9 oz)   11/15/17 97 5 kg (215 lb)     Vitals:    05/16/18 1553 05/16/18 1557   BP:  110/80   BP Location:  Right arm   Patient Position:  Sitting   Cuff Size:  Standard   Pulse:  84   SpO2:  96%   Weight: 101 kg (223 lb) 101 kg (223 lb)       GEN: [employer] appears well, alert and oriented x 3, pleasant and cooperative   HEENT: pupils equal, round, and reactive to light; extraocular muscles intact  NECK: supple, no carotid bruits   HEART: regular rhythm, normal S1 and S2, no murmurs, clicks, gallops or rubs, JVD is flat    LUNGS: clear to auscultation bilaterally; no wheezes, rales, or rhonchi   ABDOMEN: normal bowel sounds, soft, no tenderness, no distention  EXTREMITIES: peripheral pulses normal; no clubbing, cyanosis, or edema  NEURO: no focal findings   SKIN: normal without suspicious lesions on exposed skin    Labs & Results:  Lab Results   Component Value Date    WBC 3 43 (L) 02/03/2018    HGB 13 6 02/03/2018    HCT 41 3 02/03/2018    MCV 88 02/03/2018     02/03/2018       Chemistry        Component Value Date/Time     02/03/2018 1111    K 4 5 02/03/2018 1111     02/03/2018 1111    CO2 31 02/03/2018 1111    BUN 11 02/03/2018 1111    CREATININE 0 92 02/03/2018 1111        Component Value Date/Time    CALCIUM 9 0 02/03/2018 1111    ALKPHOS 72 02/03/2018 1111    AST 22 02/03/2018 1111    ALT 43 02/03/2018 1111    BILITOT 0 55 02/03/2018 1111        EKG personally reviewed by Bony Ayoub MD      Counseling / Coordination of Care  Total floor / unit time spent today 40 minutes  Greater than 50% of total time was spent with the patient and / or family counseling and / or coordination of care  A description of the counseling / coordination of care: 20 min        Thank you for the opportunity to participate in the care of this patient      Yarelis Vega MD, PhD   Edu Purvis

## 2018-05-16 NOTE — LETTER
May 16, 2018     Edie Schwartz DO  [address]    Patient: Shameka Snyder   YOB: 1975   Date of Visit: 5/16/2018       Dear Dr Jose Wynn: Thank you for referring Shameka Snyder to me for evaluation  Below are my notes for this consultation  If you have questions, please do not hesitate to call me  I look forward to following your patient along with you  Sincerely,        Kevin Alonso MD        CC: No Recipients  Kevin Alonso MD  5/16/2018  4:09 PM  Sign at close encounter    Heart Failure Outpatient Progress Note - Silvio Dominguez 41 y o  male MRN: [id number]    @ Encounter: [id number]      Assessment/Plan:    Patient Active Problem List    Diagnosis Date Noted    Elevated troponin 07/27/2017     # Myopericarditis with borderline reduced LVEF by TTE w/ mildly reduced RVSF  Clean coronaries on cardiac cath  TTE w/o focal WMA  Euvolemic on exam  Symptomatically appears to be improving  cMRI shows LVEF --45-50% w/ top normal RV size, but mildly reduced function  No e/o scar, inflammation, or infiltrative disease  Continues to have pericarditis symptoms  Due to reduction in function we will do the following:  :    Diag:  --TTE 2/1/18 shows LVEF>55%, grade 1 diastology, mild RV dilation, grade 1 diastology, no sig valvular disease  --CBC, Chem 10 and A1c 6 1 all ok  Lipid panel shows elevated LDL ~138 w/   --Repeat TTE once off pericarditis treatment    Therapeutic:   --Continue metoprolol succinate 12 5 mg PO BID   --Continue losartan 25 mg PO daily   --Stop ibuprofen 800 mg PO q8hrs as symptoms have resolved; continue colchicine x 1 month then reassess  --Back to full activity as tolerated  --Continue duloxetine      # GERD: Continue prilosec x 6 weeks  # Anxiety: Patient has a component of anxiety as well related to his recent diagnosis   States it is improving as well   # HLD    RTC in 1 month    HPI: Very pleasant 41year old gentleman w/ no PMHx p/w acute onset retrosternal pressure to [facility] [place] on 7/27/17  Zofran and viscous lidocaine given for nausea/emesis and possible reflux, respectively without resolution  Trop trend 0 00->0 25->11 4  S/p cardiac cath with no coronary disease  Symptoms likely from myocarditis +/- myopericarditis vs vasospasm  ECG ok  Seen by Dr Yo Bella in f/u  The patient was started on prilosec for GERD with improvement in throat tightness and switched from lisinopril to losartan  He states that he continues to feel a little bit of pressure in the chest when he leans backwards  He states his breathing was worse since discharge, so he was switched to losartan 25 mg PO daily  He is taking prilosec and thinks this may have helped a bit  He thinks it is getting better and better  He had his cMRI on 8/14/2017 w/ results pending  Today he returns for f/u, 9/21/2017, he states he continues to feel a little bit of pressure in the chest when he leans backwards  Last week he felt a little better, and then he worked hard at work, and it exacerbated it over the weekend  He sleeps sitting up due to the pain  Today he returns for f/u, 10/17/2017, he states he continues to feel a pressure in the chest when he leans backwards  He states that the severity is decreasing  He continues to sleep sitting up  Today he returns for f/u, 11/27/2017, he states the radiating pain is gone  He gets sharp pains with activity; he feels that it is in his ribs  When he lays down he the pressure in the chest is still there, but feels like it is improving  He fell asleep on his stomach  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 without symptoms  Energy improved  Today he returns for f/u, 1/23/2018, he states the radiating pain is gone  He gets sharp pains with activity but much less frequently now; he feels that it is in his ribs but markedly improved   When he lays down he still has pressure in the chest but significantly improved  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 without symptoms, but with strenuous activity he feels it again  Energy improved  Today he returns for f/u, 5/16/18  He did not go to [facility] as it was too far  He states that he still gets the R sided discomfort  He can sleep through the night fine, and occasionally has pain under the ribs  However, he sleeps on his stomach  Now when he lays flat on his back, the chest pains completely resolved  Now at work he is fine during work, but at night sometimes he feels a twinge  This is very rare  No past medical history on file  Review of Systems - 12 point ROS was done and is negative, except as noted above  No Known Allergies      Current Outpatient Prescriptions:     colchicine (COLCRYS) 0 6 mg tablet, Take 0 6 mg by mouth 2 (two) times a day, Disp: , Rfl: 5    DULoxetine (CYMBALTA) 30 mg delayed release capsule, Take 30 mg by mouth daily, Disp: , Rfl: 5    losartan (COZAAR) 25 mg tablet, Take 1 tablet (25 mg total) by mouth daily, Disp: 90 tablet, Rfl: 3    metoprolol succinate (TOPROL-XL) 25 mg 24 hr tablet, Take 0 5 tablets (12 5 mg total) by mouth daily at bedtime (Patient taking differently: Take 25 mg by mouth daily at bedtime  ), Disp: 30 tablet, Rfl: 0    omeprazole (PRILOSEC OTC) 20 MG tablet, Take 1 tablet by mouth daily, Disp: , Rfl:     Social History     Social History    Marital status: /Civil Union     Spouse name: N/A    Number of children: N/A    Years of education: N/A     Occupational History    Not on file  Social History Main Topics    Smoking status: Never Smoker    Smokeless tobacco: Not on file    Alcohol use Yes      Comment: weekends    Drug use: No    Sexual activity: Not on file     Other Topics Concern    Not on file     Social History Narrative    No narrative on file       No family history on file      Physical Exam:    Vitals: Blood pressure 110/80, pulse 84, weight 101 kg (223 lb), SpO2 96 %  , Body mass index is 28 63 kg/m² ,   Wt Readings from Last 3 Encounters:   05/16/18 101 kg (223 lb)   11/27/17 97 1 kg (214 lb 0 9 oz)   11/15/17 97 5 kg (215 lb)     Vitals:    05/16/18 1553 05/16/18 1557   BP:  110/80   BP Location:  Right arm   Patient Position:  Sitting   Cuff Size:  Standard   Pulse:  84   SpO2:  96%   Weight: 101 kg (223 lb) 101 kg (223 lb)       GEN: [employer] appears well, alert and oriented x 3, pleasant and cooperative   HEENT: pupils equal, round, and reactive to light; extraocular muscles intact  NECK: supple, no carotid bruits   HEART: regular rhythm, normal S1 and S2, no murmurs, clicks, gallops or rubs, JVD is flat    LUNGS: clear to auscultation bilaterally; no wheezes, rales, or rhonchi   ABDOMEN: normal bowel sounds, soft, no tenderness, no distention  EXTREMITIES: peripheral pulses normal; no clubbing, cyanosis, or edema  NEURO: no focal findings   SKIN: normal without suspicious lesions on exposed skin    Labs & Results:  Lab Results   Component Value Date    WBC 3 43 (L) 02/03/2018    HGB 13 6 02/03/2018    HCT 41 3 02/03/2018    MCV 88 02/03/2018     02/03/2018       Chemistry        Component Value Date/Time     02/03/2018 1111    K 4 5 02/03/2018 1111     02/03/2018 1111    CO2 31 02/03/2018 1111    BUN 11 02/03/2018 1111    CREATININE 0 92 02/03/2018 1111        Component Value Date/Time    CALCIUM 9 0 02/03/2018 1111    ALKPHOS 72 02/03/2018 1111    AST 22 02/03/2018 1111    ALT 43 02/03/2018 1111    BILITOT 0 55 02/03/2018 1111        EKG personally reviewed by Tejas Rosales MD      Counseling / Coordination of Care  Total floor / unit time spent today 40 minutes  Greater than 50% of total time was spent with the patient and / or family counseling and / or coordination of care  A description of the counseling / coordination of care: 20 min  Thank you for the opportunity to participate in the care of this patient      Emmit Gravely Zaida Sparks MD, PhD   Katarina Bal

## 2018-06-13 DIAGNOSIS — I10 HYPERTENSION, UNSPECIFIED TYPE: ICD-10-CM

## 2018-06-14 RX ORDER — LOSARTAN POTASSIUM 25 MG/1
TABLET ORAL
Qty: 30 TABLET | Refills: 3 | Status: SHIPPED | OUTPATIENT
Start: 2018-06-14 | End: 2018-08-13 | Stop reason: SDUPTHER

## 2018-06-25 ENCOUNTER — OFFICE VISIT (OUTPATIENT)
Dept: CARDIOLOGY CLINIC | Facility: CLINIC | Age: 43
End: 2018-06-25
Payer: COMMERCIAL

## 2018-06-25 VITALS
WEIGHT: 221 LBS | BODY MASS INDEX: 28.37 KG/M2 | OXYGEN SATURATION: 97 % | HEART RATE: 74 BPM | SYSTOLIC BLOOD PRESSURE: 110 MMHG | DIASTOLIC BLOOD PRESSURE: 80 MMHG

## 2018-06-25 DIAGNOSIS — I31.9 MYOPERICARDITIS: Primary | ICD-10-CM

## 2018-06-25 PROCEDURE — 99213 OFFICE O/P EST LOW 20 MIN: CPT | Performed by: INTERNAL MEDICINE

## 2018-06-25 NOTE — PROGRESS NOTES
Heart Failure Outpatient Progress Note - Silvio Dominguez 41 y o  male MRN: [id number]    @ Encounter: [id number]      Assessment/Plan:    Patient Active Problem List    Diagnosis Date Noted    Elevated troponin 07/27/2017     # Myopericarditis with recovered LVEF by TTE w/ mildly reduced RVSF  Clean coronaries on cardiac cath  TTE w/o focal WMA  Euvolemic on exam  Symptomatically appears to be improving  cMRI shows LVEF --45-50% w/ top normal RV size, but mildly reduced function  No e/o scar, inflammation, or infiltrative disease  Pericarditis symptoms resolved x ~1 month now  LVEF has normalized, but RVSF still reduced as noted below  Due to reduction in function we will do the following:   Diag:  --TTE 2/1/18 shows LVEF>55%, grade 1 diastology, mild RV dilation, grade 1 diastology, no sig valvular disease  --CBC, Chem 10 and A1c 6 1 all ok  Lipid panel shows elevated LDL ~138 w/   --Repeat TTE now that off pericarditis treatment    Therapeutic:   --Continue metoprolol succinate 12 5 mg PO BID   --Continue losartan 25 mg PO daily   --Stop ibuprofen 800 mg PO q8hrs as symptoms have resolved; stop colchicine for now reassess symptoms in 2 months  --Back to full activity as tolerated  --Continue duloxetine      # GERD: Continue prilosec for now  # Anxiety: Patient has a component of anxiety as well related to his recent diagnosis  States it is improving as well   # HLD    RTC in 1 month    HPI: Very pleasant 41year old gentleman w/ no PMHx p/w acute onset retrosternal pressure to [address] on 7/27/17  Zofran and viscous lidocaine given for nausea/emesis and possible reflux, respectively without resolution  Trop trend 0 00->0 25->11 4  S/p cardiac cath with no coronary disease  Symptoms likely from myocarditis +/- myopericarditis vs vasospasm  ECG ok  Seen by Dr Mark Hardin in f/u  The patient was started on prilosec for GERD with improvement in throat tightness and switched from lisinopril to losartan        He states that he continues to feel a little bit of pressure in the chest when he leans backwards  He states his breathing was worse since discharge, so he was switched to losartan 25 mg PO daily  He is taking prilosec and thinks this may have helped a bit  He thinks it is getting better and better  He had his cMRI on 8/14/2017 w/ results pending  Today he returns for f/u, 9/21/2017, he states he continues to feel a little bit of pressure in the chest when he leans backwards  Last week he felt a little better, and then he worked hard at work, and it exacerbated it over the weekend  He sleeps sitting up due to the pain  Today he returns for f/u, 10/17/2017, he states he continues to feel a pressure in the chest when he leans backwards  He states that the severity is decreasing  He continues to sleep sitting up  Today he returns for f/u, 11/27/2017, he states the radiating pain is gone  He gets sharp pains with activity; he feels that it is in his ribs  When he lays down he the pressure in the chest is still there, but feels like it is improving  He fell asleep on his stomach  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 without symptoms  Energy improved  Today he returns for f/u, 1/23/2018, he states the radiating pain is gone  He gets sharp pains with activity but much less frequently now; he feels that it is in his ribs but markedly improved  When he lays down he still has pressure in the chest but significantly improved  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 without symptoms, but with strenuous activity he feels it again  Energy improved  Today he returns for f/u, 5/16/18  He did not go to [facility] as it was too far  He states that he still gets the R sided discomfort  He can sleep through the night fine, and occasionally has pain under the ribs  However, he sleeps on his stomach  Now when he lays flat on his back, the chest pains completely resolved   Now at work he is fine during work, but at night sometimes he feels a twinge  This is very rare  Today he returns for f/u, 6/25/18  He states he feels well  He played soccer with his kids for about 20-25 minutes  He developed some mild chest discomfort at that time but it resolved quickly with rest      No past medical history on file  Review of Systems - 12 point ROS was done and is negative, except as noted above  No Known Allergies      Current Outpatient Prescriptions:     colchicine (COLCRYS) 0 6 mg tablet, Take 0 6 mg by mouth 2 (two) times a day, Disp: , Rfl: 5    DULoxetine (CYMBALTA) 30 mg delayed release capsule, Take 30 mg by mouth daily, Disp: , Rfl: 5    losartan (COZAAR) 25 mg tablet, TAKE 1 TABLET BY MOUTH EVERY DAY, Disp: 30 tablet, Rfl: 3    metoprolol succinate (TOPROL-XL) 25 mg 24 hr tablet, Take 0 5 tablets (12 5 mg total) by mouth daily at bedtime (Patient taking differently: Take 25 mg by mouth daily at bedtime  ), Disp: 30 tablet, Rfl: 0    omeprazole (PRILOSEC OTC) 20 MG tablet, Take 1 tablet by mouth daily, Disp: , Rfl:     Social History     Social History    Marital status: /Civil Union     Spouse name: N/A    Number of children: N/A    Years of education: N/A     Occupational History    Not on file  Social History Main Topics    Smoking status: Never Smoker    Smokeless tobacco: Not on file    Alcohol use Yes      Comment: weekends    Drug use: No    Sexual activity: Not on file     Other Topics Concern    Not on file     Social History Narrative    No narrative on file       No family history on file  Physical Exam:    Vitals: Blood pressure 110/80, pulse 74, weight 100 kg (221 lb), SpO2 97 %  , Body mass index is 28 37 kg/m² ,   Wt Readings from Last 3 Encounters:   06/25/18 100 kg (221 lb)   05/16/18 101 kg (223 lb)   11/27/17 97 1 kg (214 lb 0 9 oz)     Vitals:    06/25/18 1620   BP: 110/80   BP Location: Right arm   Patient Position: Sitting   Cuff Size: Standard   Pulse: 74 SpO2: 97%   Weight: 100 kg (221 lb)       GEN: [employer] appears well, alert and oriented x 3, pleasant and cooperative   HEENT: pupils equal, round, and reactive to light; extraocular muscles intact  NECK: supple, no carotid bruits   HEART: regular rhythm, normal S1 and S2, no murmurs, clicks, gallops or rubs, JVP is    LUNGS: clear to auscultation bilaterally; no wheezes, rales, or rhonchi   ABDOMEN: normal bowel sounds, soft, no tenderness, no distention  EXTREMITIES: peripheral pulses normal; no clubbing, cyanosis, or edema  NEURO: no focal findings   SKIN: normal without suspicious lesions on exposed skin    Labs & Results:  Lab Results   Component Value Date    WBC 3 43 (L) 02/03/2018    HGB 13 6 02/03/2018    HCT 41 3 02/03/2018    MCV 88 02/03/2018     02/03/2018       Chemistry        Component Value Date/Time     02/03/2018 1111    K 4 5 02/03/2018 1111     02/03/2018 1111    CO2 31 02/03/2018 1111    BUN 11 02/03/2018 1111    CREATININE 0 92 02/03/2018 1111        Component Value Date/Time    CALCIUM 9 0 02/03/2018 1111    ALKPHOS 72 02/03/2018 1111    AST 22 02/03/2018 1111    ALT 43 02/03/2018 1111    BILITOT 0 55 02/03/2018 1111        EKG personally reviewed by Tejas Rosales MD      Counseling / Coordination of Care  Total floor / unit time spent today 40 minutes  Greater than 50% of total time was spent with the patient and / or family counseling and / or coordination of care  A description of the counseling / coordination of care: 20 min  Thank you for the opportunity to participate in the care of this patient      Tejas Rosales MD, PhD   Estrada Salcedo

## 2018-06-25 NOTE — LETTER
June 25, 2018     Dagmar Matamoros DO  [address]  [email or web address]    Patient: Matt Gilbert   YOB: 1975   Date of Visit: 6/25/2018       Dear Dr Perez Nap: Thank you for referring Matt Gilbert to me for evaluation  Below are my notes for this consultation  If you have questions, please do not hesitate to call me  I look forward to following your patient along with you  Sincerely,        Bee Castaneda MD        CC: No Recipients  Bee Castaneda MD  6/25/2018  4:39 PM  Sign at close encounter    Heart Failure Outpatient Progress Note - Silvio Dominguez 41 y o  male MRN: [id number]    @ Encounter: [id number]      Assessment/Plan:    Patient Active Problem List    Diagnosis Date Noted    Elevated troponin 07/27/2017     # Myopericarditis with recovered LVEF by TTE w/ mildly reduced RVSF  Clean coronaries on cardiac cath  TTE w/o focal WMA  Euvolemic on exam  Symptomatically appears to be improving  cMRI shows LVEF --45-50% w/ top normal RV size, but mildly reduced function  No e/o scar, inflammation, or infiltrative disease  Pericarditis symptoms resolved x ~1 month now  LVEF has normalized, but RVSF still reduced as noted below  Due to reduction in function we will do the following:   Diag:  --TTE 2/1/18 shows LVEF>55%, grade 1 diastology, mild RV dilation, grade 1 diastology, no sig valvular disease  --CBC, Chem 10 and A1c 6 1 all ok  Lipid panel shows elevated LDL ~138 w/   --Repeat TTE now that off pericarditis treatment    Therapeutic:   --Continue metoprolol succinate 12 5 mg PO BID   --Continue losartan 25 mg PO daily   --Stop ibuprofen 800 mg PO q8hrs as symptoms have resolved; stop colchicine for now reassess symptoms in 2 months  --Back to full activity as tolerated  --Continue duloxetine      # GERD: Continue prilosec for now  # Anxiety: Patient has a component of anxiety as well related to his recent diagnosis   States it is improving as well # HLD    RTC in 1 month    HPI: Very pleasant 41year old gentleman w/ no PMHx p/w acute onset retrosternal pressure to [facility] on 7/27/17  Zofran and viscous lidocaine given for nausea/emesis and possible reflux, respectively without resolution  Trop trend 0 00->0 25->11 4  S/p cardiac cath with no coronary disease  Symptoms likely from myocarditis +/- myopericarditis vs vasospasm  ECG ok  Seen by Dr Marguerite Hester in f/u  The patient was started on prilosec for GERD with improvement in throat tightness and switched from lisinopril to losartan  He states that he continues to feel a little bit of pressure in the chest when he leans backwards  He states his breathing was worse since discharge, so he was switched to losartan 25 mg PO daily  He is taking prilosec and thinks this may have helped a bit  He thinks it is getting better and better  He had his cMRI on 8/14/2017 w/ results pending  Today he returns for f/u, 9/21/2017, he states he continues to feel a little bit of pressure in the chest when he leans backwards  Last week he felt a little better, and then he worked hard at work, and it exacerbated it over the weekend  He sleeps sitting up due to the pain  Today he returns for f/u, 10/17/2017, he states he continues to feel a pressure in the chest when he leans backwards  He states that the severity is decreasing  He continues to sleep sitting up  Today he returns for f/u, 11/27/2017, he states the radiating pain is gone  He gets sharp pains with activity; he feels that it is in his ribs  When he lays down he the pressure in the chest is still there, but feels like it is improving  He fell asleep on his stomach  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 without symptoms  Energy improved  Today he returns for f/u, 1/23/2018, he states the radiating pain is gone  He gets sharp pains with activity but much less frequently now; he feels that it is in his ribs but markedly improved   When he lays down he still has pressure in the chest but significantly improved  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 without symptoms, but with strenuous activity he feels it again  Energy improved  Today he returns for f/u, 5/16/18  He did not go to [facility] clinic as it was too far  He states that he still gets the R sided discomfort  He can sleep through the night fine, and occasionally has pain under the ribs  However, he sleeps on his stomach  Now when he lays flat on his back, the chest pains completely resolved  Now at work he is fine during work, but at night sometimes he feels a twinge  This is very rare  Today he returns for f/u, 6/25/18  He states he feels well  He played soccer with his kids for about 20-25 minutes  He developed some mild chest discomfort at that time but it resolved quickly with rest      No past medical history on file  Review of Systems - 12 point ROS was done and is negative, except as noted above  No Known Allergies      Current Outpatient Prescriptions:     colchicine (COLCRYS) 0 6 mg tablet, Take 0 6 mg by mouth 2 (two) times a day, Disp: , Rfl: 5    DULoxetine (CYMBALTA) 30 mg delayed release capsule, Take 30 mg by mouth daily, Disp: , Rfl: 5    losartan (COZAAR) 25 mg tablet, TAKE 1 TABLET BY MOUTH EVERY DAY, Disp: 30 tablet, Rfl: 3    metoprolol succinate (TOPROL-XL) 25 mg 24 hr tablet, Take 0 5 tablets (12 5 mg total) by mouth daily at bedtime (Patient taking differently: Take 25 mg by mouth daily at bedtime  ), Disp: 30 tablet, Rfl: 0    omeprazole (PRILOSEC OTC) 20 MG tablet, Take 1 tablet by mouth daily, Disp: , Rfl:     Social History     Social History    Marital status: /Civil Union     Spouse name: N/A    Number of children: N/A    Years of education: N/A     Occupational History    Not on file       Social History Main Topics    Smoking status: Never Smoker    Smokeless tobacco: Not on file    Alcohol use Yes      Comment: weekends    Drug use: No    Sexual activity: Not on file     Other Topics Concern    Not on file     Social History Narrative    No narrative on file       No family history on file  Physical Exam:    Vitals: Blood pressure 110/80, pulse 74, weight 100 kg (221 lb), SpO2 97 %  , Body mass index is 28 37 kg/m² ,   Wt Readings from Last 3 Encounters:   06/25/18 100 kg (221 lb)   05/16/18 101 kg (223 lb)   11/27/17 97 1 kg (214 lb 0 9 oz)     Vitals:    06/25/18 1620   BP: 110/80   BP Location: Right arm   Patient Position: Sitting   Cuff Size: Standard   Pulse: 74   SpO2: 97%   Weight: 100 kg (221 lb)       GEN: [employer] appears well, alert and oriented x 3, pleasant and cooperative   HEENT: pupils equal, round, and reactive to light; extraocular muscles intact  NECK: supple, no carotid bruits   HEART: regular rhythm, normal S1 and S2, no murmurs, clicks, gallops or rubs, JVP is    LUNGS: clear to auscultation bilaterally; no wheezes, rales, or rhonchi   ABDOMEN: normal bowel sounds, soft, no tenderness, no distention  EXTREMITIES: peripheral pulses normal; no clubbing, cyanosis, or edema  NEURO: no focal findings   SKIN: normal without suspicious lesions on exposed skin    Labs & Results:  Lab Results   Component Value Date    WBC 3 43 (L) 02/03/2018    HGB 13 6 02/03/2018    HCT 41 3 02/03/2018    MCV 88 02/03/2018     02/03/2018       Chemistry        Component Value Date/Time     02/03/2018 1111    K 4 5 02/03/2018 1111     02/03/2018 1111    CO2 31 02/03/2018 1111    BUN 11 02/03/2018 1111    CREATININE 0 92 02/03/2018 1111        Component Value Date/Time    CALCIUM 9 0 02/03/2018 1111    ALKPHOS 72 02/03/2018 1111    AST 22 02/03/2018 1111    ALT 43 02/03/2018 1111    BILITOT 0 55 02/03/2018 1111        EKG personally reviewed by Morgan Amaral MD      Counseling / Coordination of Care  Total floor / unit time spent today 40 minutes    Greater than 50% of total time was spent with the patient and / or family counseling and / or coordination of care  A description of the counseling / coordination of care: 20 min  Thank you for the opportunity to participate in the care of this patient      Maria T Bell MD, PhD   Dequan Solano

## 2018-07-21 DIAGNOSIS — F41.9 ANXIETY: Primary | ICD-10-CM

## 2018-07-23 RX ORDER — DULOXETIN HYDROCHLORIDE 30 MG/1
CAPSULE, DELAYED RELEASE ORAL
Qty: 30 CAPSULE | Refills: 5 | Status: SHIPPED | OUTPATIENT
Start: 2018-07-23 | End: 2018-10-01

## 2018-07-31 ENCOUNTER — HOSPITAL ENCOUNTER (OUTPATIENT)
Dept: NON INVASIVE DIAGNOSTICS | Facility: CLINIC | Age: 43
Discharge: HOME | End: 2018-07-31
Payer: COMMERCIAL

## 2018-07-31 DIAGNOSIS — I31.9 MYOPERICARDITIS: ICD-10-CM

## 2018-07-31 PROCEDURE — 93306 TTE W/DOPPLER COMPLETE: CPT | Performed by: INTERNAL MEDICINE

## 2018-07-31 PROCEDURE — 93306 TTE W/DOPPLER COMPLETE: CPT

## 2018-08-06 DIAGNOSIS — I15.9 SECONDARY HYPERTENSION: ICD-10-CM

## 2018-08-08 RX ORDER — METOPROLOL SUCCINATE 25 MG/1
TABLET, EXTENDED RELEASE ORAL
Qty: 30 TABLET | Refills: 1 | Status: SHIPPED | OUTPATIENT
Start: 2018-08-08 | End: 2018-10-06 | Stop reason: SDUPTHER

## 2018-08-13 ENCOUNTER — OFFICE VISIT (OUTPATIENT)
Dept: FAMILY MEDICINE CLINIC | Facility: CLINIC | Age: 43
End: 2018-08-13
Payer: COMMERCIAL

## 2018-08-13 VITALS
HEIGHT: 74 IN | WEIGHT: 227 LBS | DIASTOLIC BLOOD PRESSURE: 84 MMHG | BODY MASS INDEX: 29.13 KG/M2 | SYSTOLIC BLOOD PRESSURE: 124 MMHG

## 2018-08-13 DIAGNOSIS — I10 HYPERTENSION, UNSPECIFIED TYPE: ICD-10-CM

## 2018-08-13 DIAGNOSIS — I40.1 IDIOPATHIC MYOCARDITIS, UNSPECIFIED CHRONICITY: ICD-10-CM

## 2018-08-13 DIAGNOSIS — M54.6 ACUTE MIDLINE THORACIC BACK PAIN: ICD-10-CM

## 2018-08-13 DIAGNOSIS — F41.1 ANXIETY, GENERALIZED: Primary | ICD-10-CM

## 2018-08-13 PROBLEM — S83.249A ACUTE MENISCAL TEAR, MEDIAL: Status: ACTIVE | Noted: 2018-08-13

## 2018-08-13 PROBLEM — I51.4 MYOCARDITIS (HCC): Status: ACTIVE | Noted: 2017-08-01

## 2018-08-13 PROCEDURE — 99214 OFFICE O/P EST MOD 30 MIN: CPT | Performed by: FAMILY MEDICINE

## 2018-08-13 PROCEDURE — 3008F BODY MASS INDEX DOCD: CPT | Performed by: FAMILY MEDICINE

## 2018-08-13 RX ORDER — LOSARTAN POTASSIUM 25 MG/1
25 TABLET ORAL DAILY
Qty: 90 TABLET | Refills: 3 | Status: SHIPPED | OUTPATIENT
Start: 2018-08-13 | End: 2019-04-16

## 2018-08-13 RX ORDER — CYCLOBENZAPRINE HCL 10 MG
10 TABLET ORAL 3 TIMES DAILY PRN
Qty: 30 TABLET | Refills: 0 | Status: SHIPPED | OUTPATIENT
Start: 2018-08-13 | End: 2018-10-01

## 2018-08-13 NOTE — ASSESSMENT & PLAN NOTE
This is a new problem for the patient  Will prescribe muscle relaxants for best time, discussed sleeping position at length  Will get him some physical therapy in get him back for recheck in 3-4 weeks

## 2018-08-13 NOTE — ASSESSMENT & PLAN NOTE
Well controlled if he remembers takes medication  Tried more diligent with taking his medication on a daily basis  I will see him in 1 month to see how he is doing

## 2018-08-13 NOTE — PROGRESS NOTES
Assessment/Plan:    Acute midline thoracic back pain  This is a new problem for the patient  Will prescribe muscle relaxants for best time, discussed sleeping position at length  Will get him some physical therapy in get him back for recheck in 3-4 weeks  Anxiety, generalized  Well controlled if he remembers takes medication  Tried more diligent with taking his medication on a daily basis  I will see him in 1 month to see how he is doing  Diagnoses and all orders for this visit:    Anxiety, generalized    Acute midline thoracic back pain  -     cyclobenzaprine (FLEXERIL) 10 mg tablet; Take 1 tablet (10 mg total) by mouth 3 (three) times a day as needed for muscle spasms  -     Ambulatory referral to Physical Therapy; Future    Idiopathic myocarditis, unspecified chronicity          Subjective:   Chief Complaint   Patient presents with    Anxiety     no refills needed           Patient ID: Bren Avendano is a 41 y o  male  Patient is a 62-year-old male presenting to the office for follow-up on his anxiety, myocarditis, and general health  A new problem for him is back pain radiating around to his anterior abdominal wall early in the morning  He sleeps in different positions, but does occasionally sleep on his stomach  His mattress is approximately 8years old once he gets up and moves around, the pain will improve through the day  His heartburn is well controlled with omeprazole  He recently saw Cardiology, and they said that his myocarditis has improved greatly  They are keeping him on his current medication for now  The following portions of the patient's history were reviewed and updated as appropriate: allergies, current medications, past family history, past medical history, past social history, past surgical history and problem list     Review of Systems   Constitutional: Negative for appetite change, chills, diaphoresis, fatigue, fever and unexpected weight change     HENT: Negative for congestion, ear pain, hearing loss, nosebleeds, postnasal drip, rhinorrhea, sinus pressure, sore throat, tinnitus and trouble swallowing  Eyes: Negative for photophobia, pain, discharge, redness, itching and visual disturbance  Respiratory: Negative for cough, chest tightness, shortness of breath and wheezing  Cardiovascular: Negative for chest pain, palpitations and leg swelling  Denies orthopnea , dyspnea on exertion   Gastrointestinal: Positive for abdominal pain (Vague pressure when his back is bothering him )  Negative for abdominal distention, blood in stool, constipation, diarrhea, nausea and vomiting  Endocrine: Negative  Genitourinary: Negative for difficulty urinating, dysuria, flank pain, frequency, hematuria and urgency  Denies nocturia , erectile dysfunction   Musculoskeletal: Positive for back pain  Negative for arthralgias, gait problem, joint swelling and myalgias  Skin: Negative for pallor, rash and wound  Denies skin lesions   Allergic/Immunologic: Negative for environmental allergies, food allergies and immunocompromised state  Neurological: Negative for dizziness, tremors, seizures, syncope, speech difficulty, weakness, numbness and headaches  Hematological: Negative for adenopathy  Does not bruise/bleed easily  Psychiatric/Behavioral: Negative for behavioral problems, confusion, sleep disturbance and suicidal ideas  The patient is not nervous/anxious (Well controlled on current medication)  Objective:      /84   Ht 6' 2" (1 88 m)   Wt 103 kg (227 lb)   BMI 29 15 kg/m²          Physical Exam   Constitutional: He is oriented to person, place, and time  He appears well-developed and well-nourished  HENT:   Head: Normocephalic and atraumatic  Nose: Nose normal    Mouth/Throat: Oropharynx is clear and moist    Eyes: Conjunctivae and EOM are normal  Pupils are equal, round, and reactive to light     Neck: Normal range of motion  Neck supple  No JVD present  No tracheal deviation present  No thyromegaly present  Cardiovascular: Normal rate, regular rhythm and intact distal pulses  No murmur heard  Pulmonary/Chest: Effort normal and breath sounds normal  He has no wheezes  He has no rales  Abdominal: Soft  Bowel sounds are normal  He exhibits no mass  There is no tenderness  There is no rebound and no guarding  Musculoskeletal: He exhibits no edema, tenderness or deformity  Moderate bilateral thoracic spasm, thoracic spine is rotated right at T5-8  Lymphadenopathy:     He has no cervical adenopathy  Neurological: He is alert and oriented to person, place, and time  He has normal reflexes  No cranial nerve deficit  He exhibits normal muscle tone  Coordination normal    Skin: Skin is warm and dry  No lesion and no rash noted  Nails show no clubbing  Psychiatric: He has a normal mood and affect  His behavior is normal  Judgment and thought content normal    Nursing note and vitals reviewed

## 2018-08-22 NOTE — PROGRESS NOTES
Heart Failure Outpatient Progress Note - Silvio Dominguez 41 y o  male MRN: [id number]    @ Encounter: [id number]      Assessment/Plan:    Patient Active Problem List    Diagnosis Date Noted    Acute meniscal tear, medial 08/13/2018    Acute midline thoracic back pain 08/13/2018    Anxiety, generalized 09/01/2017    Myocarditis ([facility] [address] ) 08/01/2017    Elevated troponin 07/27/2017     # Myopericarditis with recovered LVEF by TTE w/ mildly reduced RVSF  Clean coronaries on cardiac cath  TTE w/o focal WMA  Euvolemic on exam  Symptomatically appears to be improving  cMRI shows LVEF --45-50% w/ top normal RV size, but mildly reduced function  No e/o scar, inflammation, or infiltrative disease  Pericarditis symptoms resolved x ~1 month now  LVEF has normalized, but RVSF still reduced as noted below  Due to reduction in function we will do the following:   Diag:  --TTE 2/1/18 shows LVEF>55%, grade 1 diastology, mild RV dilation, grade 1 diastology, no sig valvular disease  --CBC, Chem 10 and A1c 6 1 all ok  Lipid panel shows elevated LDL ~138 w/   --TTE 7/2018: LVEF ~50%, normal RV    Therapeutic:   --Continue metoprolol succinate 12 5 mg PO BID   --Continue losartan 25 mg PO daily   --Continue to hold ibuprofen 800 mg PO q8hrs as symptoms have resolved; stop colchicine for now reassess symptoms @ next visit  --Continue full activity as tolerated  --Continue duloxetine      # GERD: Continue prilosec for now  # Anxiety: Patient has a component of anxiety as well related to his recent diagnosis  States it is improving as well   # HLD    RTC in 6 month    HPI: Very pleasant 41year old gentleman w/ no PMHx p/w acute onset retrosternal pressure to [facility] on 7/27/17  Zofran and viscous lidocaine given for nausea/emesis and possible reflux, respectively without resolution  Trop trend 0 00->0 25->11 4  S/p cardiac cath with no coronary disease  Symptoms likely from myocarditis +/- myopericarditis vs vasospasm   ECG ok  Seen by Dr Sylvia Valverde in f/u  The patient was started on prilosec for GERD with improvement in throat tightness and switched from lisinopril to losartan  He states that he continues to feel a little bit of pressure in the chest when he leans backwards  He states his breathing was worse since discharge, so he was switched to losartan 25 mg PO daily  He is taking prilosec and thinks this may have helped a bit  He thinks it is getting better and better  He had his cMRI on 8/14/2017 w/ results pending  Today he returns for f/u, 9/21/2017, he states he continues to feel a little bit of pressure in the chest when he leans backwards  Last week he felt a little better, and then he worked hard at work, and it exacerbated it over the weekend  He sleeps sitting up due to the pain  Today he returns for f/u, 10/17/2017, he states he continues to feel a pressure in the chest when he leans backwards  He states that the severity is decreasing  He continues to sleep sitting up  Today he returns for f/u, 11/27/2017, he states the radiating pain is gone  He gets sharp pains with activity; he feels that it is in his ribs  When he lays down he the pressure in the chest is still there, but feels like it is improving  He fell asleep on his stomach  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 without symptoms  Energy improved  Today he returns for f/u, 1/23/2018, he states the radiating pain is gone  He gets sharp pains with activity but much less frequently now; he feels that it is in his ribs but markedly improved  When he lays down he still has pressure in the chest but significantly improved  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 without symptoms, but with strenuous activity he feels it again  Energy improved  Today he returns for f/u, 5/16/18  [facility] as it was too far  He states that he still gets the R sided discomfort   He can sleep through the night fine, and occasionally has pain under the ribs  However, he sleeps on his stomach  Now when he lays flat on his back, the chest pains completely resolved  Now at work he is fine during work, but at night sometimes he feels a twinge  This is very rare  Today he returns for f/u, 6/25/18  He states he feels well  He played soccer with his kids for about 20-25 minutes  He developed some mild chest discomfort at that time but it resolved quickly with rest      Today, 8/24/18, he returns for f/u  He states he feels well  Has rare heaviness in the chest when laying down  However, this is unlike prior  Past Medical History:   Diagnosis Date    Leukopenia 07/08/2016    LAST ASSESSED    Scrotal mass        Review of Systems - 12 point ROS was done and is negative, except as noted above  No Known Allergies      Current Outpatient Prescriptions:     DULoxetine (CYMBALTA) 30 mg delayed release capsule, TAKE 1 CAPSULE BY MOUTH DAILY, Disp: 30 capsule, Rfl: 5    losartan (COZAAR) 25 mg tablet, Take 1 tablet (25 mg total) by mouth daily, Disp: 90 tablet, Rfl: 3    metoprolol succinate (TOPROL-XL) 25 mg 24 hr tablet, TAKE 1 TABLET BY MOUTH EVERY DAY, Disp: 30 tablet, Rfl: 1    cyclobenzaprine (FLEXERIL) 10 mg tablet, Take 1 tablet (10 mg total) by mouth 3 (three) times a day as needed for muscle spasms (Patient not taking: Reported on 8/24/2018 ), Disp: 30 tablet, Rfl: 0    Social History     Social History    Marital status: /Civil Union     Spouse name: N/A    Number of children: N/A    Years of education: N/A     Occupational History    Not on file       Social History Main Topics    Smoking status: Never Smoker    Smokeless tobacco: Never Used    Alcohol use Yes      Comment: weekends    Drug use: No    Sexual activity: Not on file     Other Topics Concern    Not on file     Social History Narrative    PER ALLSCRIPTS: ALCOHOLISM IN RECOVERY       Family History   Problem Relation Age of Onset    Diabetes Father    Bernie Romero Cancer Sister     Cancer Brother     Stroke Brother     Lung cancer Brother        Physical Exam:    Vitals: Blood pressure 110/76, pulse 80, weight 101 kg (223 lb), SpO2 98 %  , Body mass index is 28 63 kg/m² ,   Wt Readings from Last 3 Encounters:   08/24/18 101 kg (223 lb)   08/13/18 103 kg (227 lb)   06/25/18 100 kg (221 lb)     Vitals:    08/24/18 1622   BP: 110/76   BP Location: Right arm   Patient Position: Sitting   Cuff Size: Standard   Pulse: 80   SpO2: 98%   Weight: 101 kg (223 lb)       GEN: [employer] appears well, alert and oriented x 3, pleasant and cooperative   HEENT: pupils equal, round, and reactive to light; extraocular muscles intact  NECK: supple, no carotid bruits   HEART: regular rhythm, normal S1 and S2, no murmurs, clicks, gallops or rubs, JVD is flat    LUNGS: clear to auscultation bilaterally; no wheezes, rales, or rhonchi   ABDOMEN: normal bowel sounds, soft, no tenderness, no distention  EXTREMITIES: peripheral pulses normal; no clubbing, cyanosis, or edema  NEURO: no focal findings   SKIN: normal without suspicious lesions on exposed skin    Labs & Results:  Lab Results   Component Value Date    WBC 3 43 (L) 02/03/2018    HGB 13 6 02/03/2018    HCT 41 3 02/03/2018    MCV 88 02/03/2018     02/03/2018       Chemistry        Component Value Date/Time     02/03/2018 1111    K 4 5 02/03/2018 1111     02/03/2018 1111    CO2 31 02/03/2018 1111    BUN 11 02/03/2018 1111    CREATININE 0 92 02/03/2018 1111        Component Value Date/Time    CALCIUM 9 0 02/03/2018 1111    ALKPHOS 72 02/03/2018 1111    AST 22 02/03/2018 1111    ALT 43 02/03/2018 1111    BILITOT 0 55 02/03/2018 1111        EKG personally reviewed by Leonie Yip MD      Counseling / Coordination of Care  Total floor / unit time spent today 40 minutes  Greater than 50% of total time was spent with the patient and / or family counseling and / or coordination of care    A description of the counseling / coordination of care: 20 min  Thank you for the opportunity to participate in the care of this patient      Yugn Vaughn MD, PhD   Sandip Pedroza

## 2018-08-24 ENCOUNTER — OFFICE VISIT (OUTPATIENT)
Dept: CARDIOLOGY CLINIC | Facility: CLINIC | Age: 43
End: 2018-08-24
Payer: COMMERCIAL

## 2018-08-24 VITALS
SYSTOLIC BLOOD PRESSURE: 110 MMHG | DIASTOLIC BLOOD PRESSURE: 76 MMHG | BODY MASS INDEX: 28.63 KG/M2 | WEIGHT: 223 LBS | HEART RATE: 80 BPM | OXYGEN SATURATION: 98 %

## 2018-08-24 DIAGNOSIS — I31.9 MYOPERICARDITIS: Primary | ICD-10-CM

## 2018-08-24 PROCEDURE — 99213 OFFICE O/P EST LOW 20 MIN: CPT | Performed by: INTERNAL MEDICINE

## 2018-08-24 NOTE — LETTER
August 24, 2018     Alicia Gamez DO  [address]  [place] [employer]    Patient: Diomedes Winn   YOB: 1975   Date of Visit: 8/24/2018       Dear Dr Tod Kemp: Thank you for referring Diomedes Winn to me for evaluation  Below are my notes for this consultation  If you have questions, please do not hesitate to call me  I look forward to following your patient along with you  Sincerely,        Meliza Nunez MD        CC: No Recipients  Meliza Nunez MD  8/24/2018  4:33 PM  Sign at close encounter    Heart Failure Outpatient Progress Note - Silvio Dominguez 41 y o  male MRN: [id number]    @ Encounter: [id number]      Assessment/Plan:    Patient Active Problem List    Diagnosis Date Noted    Acute meniscal tear, medial 08/13/2018    Acute midline thoracic back pain 08/13/2018    Anxiety, generalized 09/01/2017    Myocarditis ([address] ) 08/01/2017    Elevated troponin 07/27/2017     # Myopericarditis with recovered LVEF by TTE w/ mildly reduced RVSF  Clean coronaries on cardiac cath  TTE w/o focal WMA  Euvolemic on exam  Symptomatically appears to be improving  cMRI shows LVEF --45-50% w/ top normal RV size, but mildly reduced function  No e/o scar, inflammation, or infiltrative disease  Pericarditis symptoms resolved x ~1 month now  LVEF has normalized, but RVSF still reduced as noted below  Due to reduction in function we will do the following:   Diag:  --TTE 2/1/18 shows LVEF>55%, grade 1 diastology, mild RV dilation, grade 1 diastology, no sig valvular disease  --CBC, Chem 10 and A1c 6 1 all ok   Lipid panel shows elevated LDL ~138 w/   --TTE 7/2018: LVEF ~50%, normal RV    Therapeutic:   --Continue metoprolol succinate 12 5 mg PO BID   --Continue losartan 25 mg PO daily   --Continue to hold ibuprofen 800 mg PO q8hrs as symptoms have resolved; stop colchicine for now reassess symptoms @ next visit  --Continue full activity as tolerated  --Continue duloxetine      # GERD: Continue prilosec for now  # Anxiety: Patient has a component of anxiety as well related to his recent diagnosis  States it is improving as well   # HLD    RTC in 6 month    HPI: Very pleasant 41year old gentleman w/ no PMHx p/w acute onset retrosternal pressure to [facility] on 7/27/17  Zofran and viscous lidocaine given for nausea/emesis and possible reflux, respectively without resolution  Trop trend 0 00->0 25->11 4  S/p cardiac cath with no coronary disease  Symptoms likely from myocarditis +/- myopericarditis vs vasospasm  ECG ok  Seen by Dr Fior Allen in f/u  The patient was started on prilosec for GERD with improvement in throat tightness and switched from lisinopril to losartan  He states that he continues to feel a little bit of pressure in the chest when he leans backwards  He states his breathing was worse since discharge, so he was switched to losartan 25 mg PO daily  He is taking prilosec and thinks this may have helped a bit  He thinks it is getting better and better  He had his cMRI on 8/14/2017 w/ results pending  Today he returns for f/u, 9/21/2017, he states he continues to feel a little bit of pressure in the chest when he leans backwards  Last week he felt a little better, and then he worked hard at work, and it exacerbated it over the weekend  He sleeps sitting up due to the pain  Today he returns for f/u, 10/17/2017, he states he continues to feel a pressure in the chest when he leans backwards  He states that the severity is decreasing  He continues to sleep sitting up  Today he returns for f/u, 11/27/2017, he states the radiating pain is gone  He gets sharp pains with activity; he feels that it is in his ribs  When he lays down he the pressure in the chest is still there, but feels like it is improving  He fell asleep on his stomach  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 without symptoms  Energy improved      Today he returns for f/u, 1/23/2018, he states the radiating pain is gone  He gets sharp pains with activity but much less frequently now; he feels that it is in his ribs but markedly improved  When he lays down he still has pressure in the chest but significantly improved  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 without symptoms, but with strenuous activity he feels it again  Energy improved  Today he returns for f/u, 5/16/18  He did not go to [facility] as it was too far  He states that he still gets the R sided discomfort  He can sleep through the night fine, and occasionally has pain under the ribs  However, he sleeps on his stomach  Now when he lays flat on his back, the chest pains completely resolved  Now at work he is fine during work, but at night sometimes he feels a twinge  This is very rare  Today he returns for f/u, 6/25/18  He states he feels well  He played soccer with his kids for about 20-25 minutes  He developed some mild chest discomfort at that time but it resolved quickly with rest      Today, 8/24/18, he returns for f/u  He states he feels well  Has rare heaviness in the chest when laying down  However, this is unlike prior  Past Medical History:   Diagnosis Date    Leukopenia 07/08/2016    LAST ASSESSED    Scrotal mass        Review of Systems - 12 point ROS was done and is negative, except as noted above       No Known Allergies      Current Outpatient Prescriptions:     DULoxetine (CYMBALTA) 30 mg delayed release capsule, TAKE 1 CAPSULE BY MOUTH DAILY, Disp: 30 capsule, Rfl: 5    losartan (COZAAR) 25 mg tablet, Take 1 tablet (25 mg total) by mouth daily, Disp: 90 tablet, Rfl: 3    metoprolol succinate (TOPROL-XL) 25 mg 24 hr tablet, TAKE 1 TABLET BY MOUTH EVERY DAY, Disp: 30 tablet, Rfl: 1    cyclobenzaprine (FLEXERIL) 10 mg tablet, Take 1 tablet (10 mg total) by mouth 3 (three) times a day as needed for muscle spasms (Patient not taking: Reported on 8/24/2018 ), Disp: 30 tablet, Rfl: 0    Social History     Social History    Marital status: /Civil Union     Spouse name: N/A    Number of children: N/A    Years of education: N/A     Occupational History    Not on file  Social History Main Topics    Smoking status: Never Smoker    Smokeless tobacco: Never Used    Alcohol use Yes      Comment: weekends    Drug use: No    Sexual activity: Not on file     Other Topics Concern    Not on file     Social History Narrative    PER ALLSCRIPTS: ALCOHOLISM IN RECOVERY       Family History   Problem Relation Age of Onset    Diabetes Father     Cancer Sister     Cancer Brother     Stroke Brother     Lung cancer Brother        Physical Exam:    Vitals: Blood pressure 110/76, pulse 80, weight 101 kg (223 lb), SpO2 98 %  , Body mass index is 28 63 kg/m² ,   Wt Readings from Last 3 Encounters:   08/24/18 101 kg (223 lb)   08/13/18 103 kg (227 lb)   06/25/18 100 kg (221 lb)     Vitals:    08/24/18 1622   BP: 110/76   BP Location: Right arm   Patient Position: Sitting   Cuff Size: Standard   Pulse: 80   SpO2: 98%   Weight: 101 kg (223 lb)       GEN: [employer] appears well, alert and oriented x 3, pleasant and cooperative   HEENT: pupils equal, round, and reactive to light; extraocular muscles intact  NECK: supple, no carotid bruits   HEART: regular rhythm, normal S1 and S2, no murmurs, clicks, gallops or rubs, JVD is flat    LUNGS: clear to auscultation bilaterally; no wheezes, rales, or rhonchi   ABDOMEN: normal bowel sounds, soft, no tenderness, no distention  EXTREMITIES: peripheral pulses normal; no clubbing, cyanosis, or edema  NEURO: no focal findings   SKIN: normal without suspicious lesions on exposed skin    Labs & Results:  Lab Results   Component Value Date    WBC 3 43 (L) 02/03/2018    HGB 13 6 02/03/2018    HCT 41 3 02/03/2018    MCV 88 02/03/2018     02/03/2018       Chemistry        Component Value Date/Time     02/03/2018 1111    K 4 5 02/03/2018 1111     02/03/2018 1111    CO2 31 02/03/2018 1111    BUN 11 02/03/2018 1111    CREATININE 0 92 02/03/2018 1111        Component Value Date/Time    CALCIUM 9 0 02/03/2018 1111    ALKPHOS 72 02/03/2018 1111    AST 22 02/03/2018 1111    ALT 43 02/03/2018 1111    BILITOT 0 55 02/03/2018 1111        EKG personally reviewed by Venu Lou MD      Counseling / Coordination of Care  Total floor / unit time spent today 40 minutes  Greater than 50% of total time was spent with the patient and / or family counseling and / or coordination of care  A description of the counseling / coordination of care: 20 min  Thank you for the opportunity to participate in the care of this patient      Venu Lou MD, PhD   Cinthya Miller

## 2018-09-17 ENCOUNTER — EVALUATION (OUTPATIENT)
Dept: PHYSICAL THERAPY | Facility: MEDICAL CENTER | Age: 43
End: 2018-09-17
Payer: COMMERCIAL

## 2018-09-17 DIAGNOSIS — M54.6 ACUTE MIDLINE THORACIC BACK PAIN: Primary | ICD-10-CM

## 2018-09-17 PROCEDURE — G8990 OTHER PT/OT CURRENT STATUS: HCPCS | Performed by: PHYSICAL THERAPIST

## 2018-09-17 PROCEDURE — 97140 MANUAL THERAPY 1/> REGIONS: CPT | Performed by: PHYSICAL THERAPIST

## 2018-09-17 PROCEDURE — G8991 OTHER PT/OT GOAL STATUS: HCPCS | Performed by: PHYSICAL THERAPIST

## 2018-09-17 PROCEDURE — 97161 PT EVAL LOW COMPLEX 20 MIN: CPT | Performed by: PHYSICAL THERAPIST

## 2018-09-17 NOTE — PROGRESS NOTES
PT Evaluation     Today's date: 2018  Patient name: Christianne Lee  : 1975  MRN: [id number]  Referring provider: Sabino Love DO  Dx:   Encounter Diagnosis   Name Primary?  Acute midline thoracic back pain                   Assessment  Impairments: abnormal muscle firing, abnormal or restricted ROM, abnormal movement, activity intolerance, impaired physical strength, lacks appropriate home exercise program, pain with function, poor posture  and poor body mechanics    Assessment details: Christianne Lee is a 41 y o  y/o male who presents with complaints of mid thoracic back pain that began approximately 3 months ago  The patient's greatest concerns are pain when he wakes up in the morning that decreases throughout the day  Patient does not c/o pain that wakes him up at night  Patient has a hx of myopericarditis  Pain is elicited with thoracic extension and rotation bilaterally that decreased post tx  Patient presents with hypertonicity throughout thoracic paraspinal muscles, poor posture, and poor scapular neuromuscular control  Primary movement impairment diagnosis of thoracic hypomobility resulting in pathoanatomical symptoms of midline thoracic back pain, which limits his ability to wake up without pain and workout without fear of increasing back pain  Pt  will benefit from skilled PT services that includes manual therapy techniques to enhance tissue extensibility, neuromuscular re-education to facilitate motor control, therapeutic exercise to increase functional mobility, and modalities prn to reduce pain and inflammation  Understanding of Dx/Px/POC: good   Prognosis: good    Goals  Short Term Goals: to be achieved by 4 weeks  1) Patient to be independent with basic HEP  2) Decrease pain to 3/10 at its worst   3) Increase lumbar spine ROM by 15% in all deficient planes  4) Increase strength by 1/2 MMT grade in all deficient planes    5) Patient to report overall decreased pain upon waking up in the morning  Long Term Goals: to be achieved by discharge  1) FOTO equal to or greater than 68   2) Patient to be independent with comprehensive HEP  3) Abolish pain for improved quality of life  4) Thoracic spine ROM WNL all planes to improve a/iadls  5) Increase strength to 5/5 MMT grade in all planes to improve a/iadls  6) Patient to report no pain upon waking up in the morning  Plan  Patient would benefit from: PT eval  Planned modality interventions: thermotherapy: hydrocollator packs  Planned therapy interventions: joint mobilization, manual therapy, massage, neuromuscular re-education, patient education, postural training, strengthening, stretching, therapeutic exercise, home exercise program, graded exercise, functional ROM exercises, body mechanics training, activity modification, abdominal trunk stabilization and flexibility  Frequency: 2x week  Duration in weeks: 8  Treatment plan discussed with: patient      Subjective Evaluation    History of Present Illness  Onset date: ~3 months  Mechanism of injury: Patient reports that approximately 3 months ago, he woke up in the morning with mid thoracic cramping that began under his ribs and wrapped around to his mid thoracic spine  His primary care physician prescribed flexeril for muscle cramps and advised him to try not to sleep on his stomach  Patient reports that he does not have pain that wakes him up at night and no hx of cancer  He does not report any pain with breathing  He does have a hx of myopericarditis 1 year ago  Patient reports that he has the most pain in the morning when he wakes up and it dissipates throughout the day  He currently has no pain at rest   Patient works as a , which requires physical labor    Pain  Current pain ratin  At best pain ratin  At worst pain ratin  Quality: cramping  Relieving factors: change in position and medications  Aggravating factors: nothing      Diagnostic Tests  No diagnostic tests performed  Treatments  Current treatment: physical therapy  Patient Goals  Patient goals for therapy: increased strength, decreased pain, return to sport/leisure activities, independence with ADLs/IADLs and increased motion        Objective     Special Questions  Negative for night pain, respiratory pain, gallbladder problem, pancreas problem and history of cancer    Postural Observations  Seated posture: fair  Standing posture: fair  Correction of posture: has no consistent effect        Palpation   Left   No palpable tenderness to the intercostals  Hypertonic in the cervical paraspinals  Tenderness of the cervical paraspinals  Right   No palpable tenderness to the intercostals  Hypertonic in the cervical paraspinals  Tenderness of the cervical paraspinals  Tenderness   Cervical Spine   No tenderness in the left ribs/costal cartilage and right ribs/costal cartilage  Additional Tenderness Details  Thoracic paraspinal mm hypertonicity and hypomobility C7-T7    Neurological Testing     Sensation   Cervical/Thoracic   Left   Intact: light touch    Right   Intact: light touch    Active Range of Motion   Cervical/Thoracic Spine   Cervical    Flexion: WFL  Extension: WFL  Left lateral flexion: WFL  Right lateral flexion: WFL  Left rotation: [facility]  Right rotation: [facility]    Thoracic   Extension: Active thoracic extension: 25% limited  with pain  Left lateral flexion: WFL  Right lateral flexion: Active right thoracic lateral flexion: 25% limited  with pain  Left rotation: Active left thoracic rotation: 25% limited  Right rotation: Active right thoracic rotation: 25% limited   with pain    Joint Play Hypomobile: C7, T1, T2, T3, T4, T5, T6 and T7 Pain: T4 and T5     Strength/Myotome Testing   Cervical Spine     Left   Neck lateral flexion (C3): 5    Right etr  Neck lateral flexion (C3): 5    Left Shoulder     Planes of Motion   Flexion: 5   Abduction: 5 External rotation at 0°: 5     Isolated Muscles   Lower trapezius: 3+   Middle trapezius: 3+   Serratus anterior: 4   Upper trapezius: 5     Right Shoulder     Planes of Motion   Flexion: 5   Abduction: 5   External rotation at 0°: 5     Isolated Muscles   Lower trapezius: 3+   Middle trapezius: 3+   Serratus anterior: 4   Upper trapezius: 5     Left Elbow   Flexion: 5  Extension: 5    Right Elbow   Flexion: 5  Extension: 5    Left Wrist/Hand   Wrist extension: 5    Right Wrist/Hand   Wrist extension: 5  Wrist flexion: 5    Tests   Cervical     Left   Negative alar ligament integrity, Spurling's sign and transverse ligament  Right   Negative alar ligament integrity, Spurling's sign and transverse ligament       Precautions: None    Daily Treatment Diary     Manual  9/17            Seated CTJ Grade V AZ                         STM/MFR                                           Exercise Diary                           Diaphragmatic breathing 2'            Open books             Thoracic extensions foam             Scap 4                                                                                                                                                                                                                    Modalities                                                             Flowsheet Rows      Most Recent Value   PT/OT G-Codes   Current Score  53   Projected Score  68   FOTO information reviewed  Yes   Assessment Type  Evaluation   G code set  Other PT/OT Primary   Other PT Primary Current Status ()  CK   Other PT Primary Goal Status ()  BRET Villalba, PT  9/17/2018,6:55 PM

## 2018-09-19 ENCOUNTER — OFFICE VISIT (OUTPATIENT)
Dept: PHYSICAL THERAPY | Facility: MEDICAL CENTER | Age: 43
End: 2018-09-19
Payer: COMMERCIAL

## 2018-09-19 DIAGNOSIS — M54.6 ACUTE MIDLINE THORACIC BACK PAIN: Primary | ICD-10-CM

## 2018-09-19 PROCEDURE — 97110 THERAPEUTIC EXERCISES: CPT | Performed by: PHYSICAL THERAPIST

## 2018-09-19 PROCEDURE — 97112 NEUROMUSCULAR REEDUCATION: CPT | Performed by: PHYSICAL THERAPIST

## 2018-09-19 PROCEDURE — 97140 MANUAL THERAPY 1/> REGIONS: CPT | Performed by: PHYSICAL THERAPIST

## 2018-09-19 NOTE — PROGRESS NOTES
Daily Note     Today's date: 2018  Patient name: Man Ceullo  : 1975  MRN: [id number]  Referring provider: Dusty Duverney, DO  Dx:   Encounter Diagnosis     ICD-10-CM    1  Acute midline thoracic back pain M54 6                   Subjective: Patient reports that he feels a little looser today and felt less pain in the morning  Objective: See treatment diary below    Precautions: None    Daily Treatment Diary     Manual             Seated CTJ Grade V AZ            Thoracic mobs P-A  AZ           STM/MFR                                           Exercise Diary                           Diaphragmatic breathing 2' 2'           DLS barbara  67T           LTR  20x           Open books  10x b/l                        Thoracic extensions foam             Scap 4                                                                                                                                                                                                                    Modalities                                                       Assessment:  Patient presents with mild pain at T5-T7 with thoracic P-A mobilizations and moderate myofascial restrictions along paraspinal mm  Decreased pain after thoracic extensions with overpressure  Plan to progress patient with diaphragmatic breathing and core stabilization exercises to decrease tension and to enhance posture during activities of daily living  Tolerated treatment well  Patient would benefit from continued PT  Plan: Continue per plan of care

## 2018-09-24 ENCOUNTER — OFFICE VISIT (OUTPATIENT)
Dept: PHYSICAL THERAPY | Facility: MEDICAL CENTER | Age: 43
End: 2018-09-24
Payer: COMMERCIAL

## 2018-09-24 DIAGNOSIS — M54.6 ACUTE MIDLINE THORACIC BACK PAIN: Primary | ICD-10-CM

## 2018-09-24 PROCEDURE — 97112 NEUROMUSCULAR REEDUCATION: CPT | Performed by: PHYSICAL THERAPIST

## 2018-09-24 PROCEDURE — 97140 MANUAL THERAPY 1/> REGIONS: CPT | Performed by: PHYSICAL THERAPIST

## 2018-09-24 PROCEDURE — 97110 THERAPEUTIC EXERCISES: CPT | Performed by: PHYSICAL THERAPIST

## 2018-09-24 NOTE — PROGRESS NOTES
Daily Note     Today's date: 2018  Patient name: Shameka Snyder  : 1975  MRN: [id number]  Referring provider: Michelle Deluna DO  Dx:   Encounter Diagnosis     ICD-10-CM    1  Acute midline thoracic back pain M54 6                   Subjective: Patient reports that he is feeling overall less pain in the morning  Objective: See treatment diary below    Precautions: None    Daily Treatment Diary     Manual            Seated CTJ Grade V AZ  GradeV AZ          Thoracic mobs UPA for rotation L  AZ AZ          STM/MFR                                           Exercise Diary                           Diaphragmatic breathing 2' 2' 2'          DLS  46B          LTR  20x 20x          Dead bugs   2x10          Open books  10x b/l 10x b/l          Banded pull aparts on foam   30x blue          Thoracic extensions foam   20x          Scap 4                                                                                                                                                                                                                    Modalities                                                       Assessment:  Patient presents with thoracic rotation right T5-T8 and paraspinal m tightness on the right  Patient fatigued after dead bugs  No pain with exercise progressions  Plan to progress patient with diaphragmatic breathing and core stabilization exercises to decrease tension and to enhance neuromuscular control during activities of daily living  Tolerated treatment well  Patient would benefit from continued PT  Plan: Continue per plan of care

## 2018-09-26 ENCOUNTER — OFFICE VISIT (OUTPATIENT)
Dept: PHYSICAL THERAPY | Facility: MEDICAL CENTER | Age: 43
End: 2018-09-26
Payer: COMMERCIAL

## 2018-09-26 DIAGNOSIS — M54.6 ACUTE MIDLINE THORACIC BACK PAIN: Primary | ICD-10-CM

## 2018-09-26 PROCEDURE — 97112 NEUROMUSCULAR REEDUCATION: CPT | Performed by: PHYSICAL THERAPIST

## 2018-09-26 PROCEDURE — 97110 THERAPEUTIC EXERCISES: CPT | Performed by: PHYSICAL THERAPIST

## 2018-09-26 PROCEDURE — 97140 MANUAL THERAPY 1/> REGIONS: CPT | Performed by: PHYSICAL THERAPIST

## 2018-10-01 ENCOUNTER — OFFICE VISIT (OUTPATIENT)
Dept: FAMILY MEDICINE CLINIC | Facility: CLINIC | Age: 43
End: 2018-10-01
Payer: COMMERCIAL

## 2018-10-01 ENCOUNTER — OFFICE VISIT (OUTPATIENT)
Dept: PHYSICAL THERAPY | Facility: MEDICAL CENTER | Age: 43
End: 2018-10-01
Payer: COMMERCIAL

## 2018-10-01 VITALS
SYSTOLIC BLOOD PRESSURE: 118 MMHG | DIASTOLIC BLOOD PRESSURE: 78 MMHG | HEIGHT: 74 IN | TEMPERATURE: 96.9 F | BODY MASS INDEX: 29.16 KG/M2 | WEIGHT: 227.25 LBS

## 2018-10-01 DIAGNOSIS — E78.5 DYSLIPIDEMIA: ICD-10-CM

## 2018-10-01 DIAGNOSIS — M54.6 ACUTE MIDLINE THORACIC BACK PAIN: Primary | ICD-10-CM

## 2018-10-01 DIAGNOSIS — R73.9 HYPERGLYCEMIA: ICD-10-CM

## 2018-10-01 DIAGNOSIS — I40.1 IDIOPATHIC MYOCARDITIS, UNSPECIFIED CHRONICITY: Primary | ICD-10-CM

## 2018-10-01 DIAGNOSIS — Z23 NEED FOR INFLUENZA VACCINATION: ICD-10-CM

## 2018-10-01 PROBLEM — R77.8 ELEVATED TROPONIN: Status: RESOLVED | Noted: 2017-07-27 | Resolved: 2018-10-01

## 2018-10-01 PROBLEM — F41.1 ANXIETY, GENERALIZED: Status: RESOLVED | Noted: 2017-09-01 | Resolved: 2018-10-01

## 2018-10-01 PROCEDURE — 97140 MANUAL THERAPY 1/> REGIONS: CPT | Performed by: PHYSICAL THERAPIST

## 2018-10-01 PROCEDURE — 97110 THERAPEUTIC EXERCISES: CPT | Performed by: PHYSICAL THERAPIST

## 2018-10-01 PROCEDURE — 99213 OFFICE O/P EST LOW 20 MIN: CPT | Performed by: FAMILY MEDICINE

## 2018-10-01 PROCEDURE — 90682 RIV4 VACC RECOMBINANT DNA IM: CPT

## 2018-10-01 PROCEDURE — 90471 IMMUNIZATION ADMIN: CPT

## 2018-10-01 PROCEDURE — 97112 NEUROMUSCULAR REEDUCATION: CPT | Performed by: PHYSICAL THERAPIST

## 2018-10-01 NOTE — PROGRESS NOTES
Daily Note     Today's date: 10/1/2018  Patient name: Lashell Dumont  : 1975  MRN: [id number]  Referring provider: Jorge A Alfred DO  Dx:   Encounter Diagnosis     ICD-10-CM    1  Acute midline thoracic back pain M54 6                   Subjective: Patient reports that he continues to feel better  Objective: See treatment diary below    Precautions: None    Daily Treatment Diary     Manual  9/17 9/19 9/24 9/26 10/1        Seated CTJ Grade V AZ  GradeV AZ Grade V AZ Grade V AZ        Thoracic mobs UPA for rotation L  AZ AZ AZ AZ        STM/MFR                                           Exercise Diary                           Diaphragmatic breathing 2' 2' 2' 2' HEP        DLS marches  79Q 29L 20x HEP        LTR  20x 20x 20x 20x        Dead bugs   2x10 3x10 3x10        Open books  10x b/l 10x b/l 10x b/l 10x b/l        Banded pull aparts on foam   30x blue 30x blue 30x blue        Thoracic extensions foam   20x 20x 20x        Thoracic rotations quadruped    10x b/l 10x b/l        Scap 4    W's GR 20x 5s W's GR 30x 5s        Cat/camel     10x        Quadruped opp arm/opp leg     2x10                                                                                                                                                                                     Modalities                                                       Assessment:  Patient presents with thoracic rotation right T5-T8 and paraspinal m tightness on the right  Patient requires verbal cues during diaphragmatic breathing and core stabilization exercises to decrease tension and to enhance neuromuscular control  Tolerated treatment well  Patient would benefit from continued PT  Plan: Continue per plan of care

## 2018-10-01 NOTE — PROGRESS NOTES
Assessment/Plan:    Myocarditis ([facility] [address] )  Pretty much asymptomatic at this point  Will follow up with Cardiology as scheduled  Hyperglycemia  Due for lab work  Staff will contact him once we have the results    Dyslipidemia  Continue diet and exercise  Due for lab work  Staff will contact him once we have the results       Diagnoses and all orders for this visit:    Idiopathic myocarditis, unspecified chronicity  -     CBC and differential; Future  -     Urinalysis with reflex to microscopic    Need for influenza vaccination  -     influenza vaccine, [id number], quadrivalent, recombinant, PF, 0 5 mL, for patients 18-49 yr with comorbidities (FLUBLOK)    Dyslipidemia  -     CBC and differential; Future  -     Comprehensive metabolic panel; Future  -     Lipid panel; Future  -     TSH, 3rd generation; Future  -     Urinalysis with reflex to microscopic    Hyperglycemia  -     CBC and differential; Future  -     Comprehensive metabolic panel; Future  -     Hemoglobin A1C; Future          Subjective:   Chief Complaint   Patient presents with    Anxiety     checkup        Patient ID: Errol Kumar is a 41 y o  male  Patient is a 46-year-old male presenting to the office for follow-up on his thoracic strain, anxiety, and myocarditis  His back is much improved, he is finishing up with physical therapy  He has joined a gym and is starting to work out again  Cardiology discontinued his ibuprofen, he is asymptomatic from their standpoint  He is going to continue on his current medication and follow up with them in 6 months  His anxiety is much improved  He stopped taking the medication about 2 weeks ago after he ran out of it on a weekend  He has had no sequela a, and does not think he needs take medication at this point          The following portions of the patient's history were reviewed and updated as appropriate: allergies, current medications, past family history, past medical history, past social history, past surgical history and problem list     Review of Systems   Constitutional: Negative for appetite change, chills, diaphoresis, fatigue, fever and unexpected weight change  HENT: Negative for congestion, ear pain, hearing loss, nosebleeds, postnasal drip, rhinorrhea, sinus pressure, sore throat, tinnitus and trouble swallowing  Eyes: Negative for photophobia, pain, discharge, redness, itching and visual disturbance  Respiratory: Negative for cough, chest tightness, shortness of breath and wheezing  Cardiovascular: Negative for chest pain, palpitations and leg swelling  Denies orthopnea , dyspnea on exertion   Gastrointestinal: Negative for abdominal distention, abdominal pain, blood in stool, constipation, diarrhea, nausea and vomiting  Endocrine: Negative  Genitourinary: Negative for difficulty urinating, dysuria, flank pain, frequency, hematuria and urgency  Denies nocturia , erectile dysfunction   Musculoskeletal: Negative for arthralgias, back pain, gait problem, joint swelling and myalgias  Skin: Negative for pallor, rash and wound  Denies skin lesions   Allergic/Immunologic: Negative for environmental allergies, food allergies and immunocompromised state  Neurological: Negative for dizziness, tremors, seizures, syncope, speech difficulty, weakness, numbness and headaches  Hematological: Negative for adenopathy  Does not bruise/bleed easily  Psychiatric/Behavioral: Negative for behavioral problems, confusion, sleep disturbance and suicidal ideas  The patient is not nervous/anxious  Objective:      /78   Temp (!) 96 9 °F (36 1 °C)   Ht 6' 2" (1 88 m)   Wt 103 kg (227 lb 4 oz)   BMI 29 18 kg/m²          Physical Exam   Constitutional: He is oriented to person, place, and time  He appears well-developed and well-nourished  HENT:   Head: Normocephalic and atraumatic     Nose: Nose normal    Mouth/Throat: Oropharynx is clear and moist  Eyes: Pupils are equal, round, and reactive to light  Conjunctivae and EOM are normal    Neck: Normal range of motion  Neck supple  No JVD present  No tracheal deviation present  No thyromegaly present  Cardiovascular: Normal rate, regular rhythm and intact distal pulses  No murmur heard  Pulmonary/Chest: Effort normal and breath sounds normal  He has no wheezes  He has no rales  Abdominal: Soft  Bowel sounds are normal  He exhibits no mass  There is no tenderness  There is no rebound and no guarding  Musculoskeletal: He exhibits no edema, tenderness or deformity  Lymphadenopathy:     He has no cervical adenopathy  Neurological: He is alert and oriented to person, place, and time  He has normal reflexes  No cranial nerve deficit  He exhibits normal muscle tone  Coordination normal    Skin: Skin is warm and dry  No lesion and no rash noted  Nails show no clubbing  Psychiatric: He has a normal mood and affect  Judgment normal    Nursing note and vitals reviewed

## 2018-10-01 NOTE — PATIENT INSTRUCTIONS
With his anxiety being resolved off medication and his myocarditis symptoms resolved, I will see him in 6 months unless there is a problem

## 2018-10-03 ENCOUNTER — OFFICE VISIT (OUTPATIENT)
Dept: PHYSICAL THERAPY | Facility: MEDICAL CENTER | Age: 43
End: 2018-10-03
Payer: COMMERCIAL

## 2018-10-03 DIAGNOSIS — M54.6 ACUTE MIDLINE THORACIC BACK PAIN: Primary | ICD-10-CM

## 2018-10-03 PROCEDURE — 97112 NEUROMUSCULAR REEDUCATION: CPT | Performed by: PHYSICAL THERAPIST

## 2018-10-03 PROCEDURE — 97110 THERAPEUTIC EXERCISES: CPT | Performed by: PHYSICAL THERAPIST

## 2018-10-03 PROCEDURE — 97140 MANUAL THERAPY 1/> REGIONS: CPT | Performed by: PHYSICAL THERAPIST

## 2018-10-03 NOTE — PROGRESS NOTES
Daily Note     Today's date: 10/3/2018  Patient name: Elpidio Reaves  : 1975  MRN: [id number]  Referring provider: Kang Cage DO  Dx:   Encounter Diagnosis     ICD-10-CM    1  Acute midline thoracic back pain M54 6                   Subjective: Patient reports that he feels the best he has in years  Objective: See treatment diary below    Precautions: None    Daily Treatment Diary     Manual  9/17 9/19 9/24 9/26 10/1 10/3       Seated CTJ Grade V AZ  GradeV AZ Grade V AZ Grade V AZ        Thoracic mobs UPA for rotation L  AZ AZ AZ AZ AZ       STM/MFR                                           Exercise Diary                           Diaphragmatic breathing 2' 2' 2' 2' HEP        DLS marches  10T 54V 20x HEP        LTR  20x 20x 20x 20x 20x and at 90/90       Dead bugs   2x10 3x10 3x10 3x10       Open books  10x b/l 10x b/l 10x b/l 10x b/l 10x b/l       Banded pull aparts on foam   30x blue 30x blue 30x blue 30x blue       Thoracic extensions foam   20x 20x 20x        Thoracic rotations quadruped    10x b/l 10x b/l        Scap 4    W's GR 20x 5s W's GR 30x 5s W's GR 30x 5s       Cat/camel     10x 10x       Quadruped opp arm/opp leg     2x10 2x10                    Tall kneeling      10x 5s blue       1/2 kneeling       10x                                                                                                                                              Modalities                                                       Assessment:  Patient presents with thoracic rotation right T5-T8 and paraspinal m tightness on the right  Patient challenged with tall kneeling anti rotational and 1/2 kneeling core stability exercises  Continue to progress with NMRE to allow for pain free return to all functional activities  Tolerated treatment well  Patient would benefit from continued PT  Plan: Continue per plan of care

## 2018-10-06 DIAGNOSIS — I15.9 SECONDARY HYPERTENSION: ICD-10-CM

## 2018-10-08 ENCOUNTER — OFFICE VISIT (OUTPATIENT)
Dept: PHYSICAL THERAPY | Facility: MEDICAL CENTER | Age: 43
End: 2018-10-08
Payer: COMMERCIAL

## 2018-10-08 DIAGNOSIS — M54.6 ACUTE MIDLINE THORACIC BACK PAIN: Primary | ICD-10-CM

## 2018-10-08 PROCEDURE — 97110 THERAPEUTIC EXERCISES: CPT | Performed by: PHYSICAL THERAPIST

## 2018-10-08 PROCEDURE — 97112 NEUROMUSCULAR REEDUCATION: CPT | Performed by: PHYSICAL THERAPIST

## 2018-10-08 PROCEDURE — 97140 MANUAL THERAPY 1/> REGIONS: CPT | Performed by: PHYSICAL THERAPIST

## 2018-10-08 NOTE — PROGRESS NOTES
Daily Note     Today's date: 10/8/2018  Patient name: Terry Lua  : 1975  MRN: [id number]  Referring provider: Marya Bailon DO  Dx:   Encounter Diagnosis     ICD-10-CM    1  Acute midline thoracic back pain M54 6                   Subjective: Patient reports that he continues to feel good, with occasional mild discomfort in the morning that disappears within an hour  Objective: See treatment diary below    Precautions: None    Daily Treatment Diary     Manual  9/17 9/19 9/24 9/26 10/1 10/3 10/8      Seated CTJ Grade V AZ  GradeV AZ Grade V AZ Grade V AZ  AZ      Thoracic mobs UPA for rotation L  AZ AZ AZ AZ AZ AZ      STM/MFR                                           Exercise Diary                           Diaphragmatic breathing 2' 2' 2' 2' HEP        DLS marches  72W 81F 20x HEP        LTR  20x 20x 20x 20x 20x and at 90/90 20x       Dead bugs   2x10 3x10 3x10 3x10 3x10      Open books  10x b/l 10x b/l 10x b/l 10x b/l 10x b/l 10x b/l      Banded pull aparts on foam   30x blue 30x blue 30x blue 30x blue 30x purple      Thoracic extensions foam   20x 20x 20x  Pillow 2'      Thoracic rotations quadruped    10x b/l 10x b/l  10x b/l      Scap 4    W's GR 20x 5s W's GR 30x 5s W's GR 30x 5s W's GR 30x 5s      Cat/camel     10x 10x 10x      Quadruped opp arm/opp leg     2x10 2x10 2x10                   Tall kneeling      10x 5s blue 10x 10x blue      1/2 kneeling       10x                                                                                                                                              Modalities                                                       Assessment:  Patient presents with thoracic hypomobility with rotation left at T5-T8 and paraspinal m tightness on the right  Added thoracic extensions on pillow in supine for 2' to enhance mobility  Patient responded well to grade V CTJ manipulation  Continue to progress with NMRE to allow for pain free return to all functional activities  Tolerated treatment well  Patient would benefit from continued PT  Plan: Continue per plan of care

## 2018-10-09 RX ORDER — METOPROLOL SUCCINATE 25 MG/1
TABLET, EXTENDED RELEASE ORAL
Qty: 30 TABLET | Refills: 1 | Status: SHIPPED | OUTPATIENT
Start: 2018-10-09 | End: 2019-03-18

## 2018-10-15 ENCOUNTER — APPOINTMENT (OUTPATIENT)
Dept: PHYSICAL THERAPY | Facility: MEDICAL CENTER | Age: 43
End: 2018-10-15
Payer: COMMERCIAL

## 2018-10-21 ENCOUNTER — APPOINTMENT (OUTPATIENT)
Dept: LAB | Facility: MEDICAL CENTER | Age: 43
End: 2018-10-21
Payer: COMMERCIAL

## 2018-10-21 DIAGNOSIS — I40.1 IDIOPATHIC MYOCARDITIS, UNSPECIFIED CHRONICITY: ICD-10-CM

## 2018-10-21 DIAGNOSIS — E78.5 DYSLIPIDEMIA: ICD-10-CM

## 2018-10-21 DIAGNOSIS — R73.9 HYPERGLYCEMIA: ICD-10-CM

## 2018-10-21 LAB
ALBUMIN SERPL BCP-MCNC: 3.7 G/DL (ref 3.5–5)
ALP SERPL-CCNC: 86 U/L (ref 46–116)
ALT SERPL W P-5'-P-CCNC: 49 U/L (ref 12–78)
ANION GAP SERPL CALCULATED.3IONS-SCNC: 4 MMOL/L (ref 4–13)
AST SERPL W P-5'-P-CCNC: 20 U/L (ref 5–45)
BASOPHILS # BLD AUTO: 0.07 THOUSANDS/ΜL (ref 0–0.1)
BASOPHILS NFR BLD AUTO: 2 % (ref 0–1)
BILIRUB SERPL-MCNC: 0.32 MG/DL (ref 0.2–1)
BILIRUB UR QL STRIP: NEGATIVE
BUN SERPL-MCNC: 14 MG/DL (ref 5–25)
CALCIUM SERPL-MCNC: 8.8 MG/DL (ref 8.3–10.1)
CHLORIDE SERPL-SCNC: 103 MMOL/L (ref 100–108)
CHOLEST SERPL-MCNC: 204 MG/DL (ref 50–200)
CLARITY UR: CLEAR
CO2 SERPL-SCNC: 30 MMOL/L (ref 21–32)
COLOR UR: YELLOW
CREAT SERPL-MCNC: 0.93 MG/DL (ref 0.6–1.3)
EOSINOPHIL # BLD AUTO: 0.41 THOUSAND/ΜL (ref 0–0.61)
EOSINOPHIL NFR BLD AUTO: 10 % (ref 0–6)
ERYTHROCYTE [DISTWIDTH] IN BLOOD BY AUTOMATED COUNT: 13.2 % (ref 11.6–15.1)
EST. AVERAGE GLUCOSE BLD GHB EST-MCNC: 134 MG/DL
GFR SERPL CREATININE-BSD FRML MDRD: 102 ML/MIN/1.73SQ M
GLUCOSE P FAST SERPL-MCNC: 110 MG/DL (ref 65–99)
GLUCOSE UR STRIP-MCNC: NEGATIVE MG/DL
HBA1C MFR BLD: 6.3 % (ref 4.2–6.3)
HCT VFR BLD AUTO: 44.1 % (ref 36.5–49.3)
HDLC SERPL-MCNC: 48 MG/DL (ref 40–60)
HGB BLD-MCNC: 14.3 G/DL (ref 12–17)
HGB UR QL STRIP.AUTO: NEGATIVE
IMM GRANULOCYTES # BLD AUTO: 0.02 THOUSAND/UL (ref 0–0.2)
IMM GRANULOCYTES NFR BLD AUTO: 1 % (ref 0–2)
KETONES UR STRIP-MCNC: NEGATIVE MG/DL
LDLC SERPL CALC-MCNC: 142 MG/DL (ref 0–100)
LEUKOCYTE ESTERASE UR QL STRIP: NEGATIVE
LYMPHOCYTES # BLD AUTO: 1.6 THOUSANDS/ΜL (ref 0.6–4.47)
LYMPHOCYTES NFR BLD AUTO: 41 % (ref 14–44)
MCH RBC QN AUTO: 28.9 PG (ref 26.8–34.3)
MCHC RBC AUTO-ENTMCNC: 32.4 G/DL (ref 31.4–37.4)
MCV RBC AUTO: 89 FL (ref 82–98)
MONOCYTES # BLD AUTO: 0.53 THOUSAND/ΜL (ref 0.17–1.22)
MONOCYTES NFR BLD AUTO: 13 % (ref 4–12)
NEUTROPHILS # BLD AUTO: 1.32 THOUSANDS/ΜL (ref 1.85–7.62)
NEUTS SEG NFR BLD AUTO: 33 % (ref 43–75)
NITRITE UR QL STRIP: NEGATIVE
NONHDLC SERPL-MCNC: 156 MG/DL
NRBC BLD AUTO-RTO: 0 /100 WBCS
PH UR STRIP.AUTO: 6 [PH] (ref 4.5–8)
PLATELET # BLD AUTO: 330 THOUSANDS/UL (ref 149–390)
PMV BLD AUTO: 11.2 FL (ref 8.9–12.7)
POTASSIUM SERPL-SCNC: 4.4 MMOL/L (ref 3.5–5.3)
PROT SERPL-MCNC: 7.5 G/DL (ref 6.4–8.2)
PROT UR STRIP-MCNC: NEGATIVE MG/DL
RBC # BLD AUTO: 4.94 MILLION/UL (ref 3.88–5.62)
SODIUM SERPL-SCNC: 137 MMOL/L (ref 136–145)
SP GR UR STRIP.AUTO: 1.02 (ref 1–1.03)
TRIGL SERPL-MCNC: 70 MG/DL
TSH SERPL DL<=0.05 MIU/L-ACNC: 1.15 UIU/ML (ref 0.36–3.74)
UROBILINOGEN UR QL STRIP.AUTO: 0.2 E.U./DL
WBC # BLD AUTO: 3.95 THOUSAND/UL (ref 4.31–10.16)

## 2018-10-21 PROCEDURE — 85025 COMPLETE CBC W/AUTO DIFF WBC: CPT

## 2018-10-21 PROCEDURE — 84443 ASSAY THYROID STIM HORMONE: CPT

## 2018-10-21 PROCEDURE — 80061 LIPID PANEL: CPT

## 2018-10-21 PROCEDURE — 80053 COMPREHEN METABOLIC PANEL: CPT

## 2018-10-21 PROCEDURE — 81003 URINALYSIS AUTO W/O SCOPE: CPT | Performed by: FAMILY MEDICINE

## 2018-10-21 PROCEDURE — 83036 HEMOGLOBIN GLYCOSYLATED A1C: CPT

## 2018-10-21 PROCEDURE — 36415 COLL VENOUS BLD VENIPUNCTURE: CPT

## 2018-10-22 ENCOUNTER — OFFICE VISIT (OUTPATIENT)
Dept: PHYSICAL THERAPY | Facility: MEDICAL CENTER | Age: 43
End: 2018-10-22
Payer: COMMERCIAL

## 2018-10-22 DIAGNOSIS — M54.6 ACUTE MIDLINE THORACIC BACK PAIN: Primary | ICD-10-CM

## 2018-10-22 PROCEDURE — 97112 NEUROMUSCULAR REEDUCATION: CPT | Performed by: PHYSICAL THERAPIST

## 2018-10-22 PROCEDURE — 97110 THERAPEUTIC EXERCISES: CPT | Performed by: PHYSICAL THERAPIST

## 2018-10-22 PROCEDURE — 97140 MANUAL THERAPY 1/> REGIONS: CPT | Performed by: PHYSICAL THERAPIST

## 2018-10-22 NOTE — PROGRESS NOTES
Daily Note     Today's date: 10/22/2018  Patient name: Leisa Lombardi  : 1975  MRN: [id number]  Referring provider: Carlos Leiva DO  Dx:   Encounter Diagnosis     ICD-10-CM    1  Acute midline thoracic back pain M54 6                   Subjective: Patient reports that he had a week without morning discomfort, but felt it this morning after 1 week without exercises d/t being sick  Objective: See treatment diary below    Precautions: None    Daily Treatment Diary     Manual  9/17 9/19 9/24 9/26 10/1 10/3 10/8 10/22     Seated CTJ Grade V AZ  GradeV AZ Grade V AZ Grade V AZ  AZ AZ     Thoracic mobs UPA for rotation L  AZ AZ AZ AZ AZ AZ AZ     STM/MFR                                           Exercise Diary                           Diaphragmatic breathing 2' 2' 2' 2' HEP        DLS marches  77F 96C 20x HEP        LTR  20x 20x 20x 20x 20x and at 90/90 20x  20x     Dead bugs   2x10 3x10 3x10 3x10 3x10 3x10     Open books  10x b/l 10x b/l 10x b/l 10x b/l 10x b/l 10x b/l 10x b/l     Banded pull aparts on foam   30x blue 30x blue 30x blue 30x blue 30x purple 30x purple     Thoracic extensions foam   20x 20x 20x  Pillow 2' 20x     Thoracic rotations quadruped    10x b/l 10x b/l  10x b/l 10x b/l     Scap 4    W's GR 20x 5s W's GR 30x 5s W's GR 30x 5s W's GR 30x 5s W's Blue 20x 5s     Cat/camel     10x 10x 10x 10x     Quadruped opp arm/opp leg     2x10 2x10 2x10 2x10                  Tall kneeling      10x 5s blue 10x 10x blue      1/2 kneeling       10x                                                                                                                                              Modalities                                                       Assessment:  Patient presents with thoracic paraspinal m spasms on the right and thoracic hypomobility with rotation left at T5-T8  Patient responded well to grade V CTJ manipulation    Continue to progress with NMRE to allow for pain free return to all functional activities  Tolerated treatment well  Patient would benefit from continued PT  Plan: Continue per plan of care

## 2018-10-29 ENCOUNTER — OFFICE VISIT (OUTPATIENT)
Dept: PHYSICAL THERAPY | Facility: MEDICAL CENTER | Age: 43
End: 2018-10-29
Payer: COMMERCIAL

## 2018-10-29 DIAGNOSIS — M54.6 ACUTE MIDLINE THORACIC BACK PAIN: Primary | ICD-10-CM

## 2018-10-29 PROCEDURE — 97140 MANUAL THERAPY 1/> REGIONS: CPT | Performed by: PHYSICAL THERAPIST

## 2018-10-29 PROCEDURE — 97110 THERAPEUTIC EXERCISES: CPT | Performed by: PHYSICAL THERAPIST

## 2018-10-29 PROCEDURE — 97112 NEUROMUSCULAR REEDUCATION: CPT | Performed by: PHYSICAL THERAPIST

## 2018-10-29 NOTE — PROGRESS NOTES
Daily Note     Today's date: 10/29/2018  Patient name: Kerry Ortiz  : 1975  MRN: [id number]  Referring provider: Gabriella Murray DO  Dx:   Encounter Diagnosis     ICD-10-CM    1  Acute midline thoracic back pain M54 6                   Subjective: Patient reports that his back feels stiff today on the right side  Objective: See treatment diary below    Precautions: None    Daily Treatment Diary     Manual  9/17 9/19 9/24 9/26 10/1 10/3 10/8 10/22 10/29    Seated CTJ Grade V AZ  GradeV AZ Grade V AZ Grade V AZ  AZ AZ     Thoracic mobs UPA for rotation L  AZ AZ AZ AZ AZ AZ AZ AZ    STM/MFR                                           Exercise Diary                           Diaphragmatic breathing 2' 2' 2' 2' HEP        DLS marches  61B 75D 20x HEP        LTR  20x 20x 20x 20x 20x and at 90/90 20x  20x 20x    Dead bugs   2x10 3x10 3x10 3x10 3x10 3x10 3x10    Open books  10x b/l 10x b/l 10x b/l 10x b/l 10x b/l 10x b/l 10x b/l 10x    Banded pull aparts on foam   30x blue 30x blue 30x blue 30x blue 30x purple 30x purple 30x purple    Thoracic extensions foam   20x 20x 20x  Pillow 2' 20x 20x    Thoracic rotations quadruped    10x b/l 10x b/l  10x b/l 10x b/l 10x b/l    Scap 4    W's GR 20x 5s W's GR 30x 5s W's GR 30x 5s W's GR 30x 5s W's Blue 20x 5s W's & rows blue 20x 5s    Cat/camel     10x 10x 10x 10x 10x    Quadruped opp arm/opp leg     2x10 2x10 2x10 2x10 2x10                 Tall kneeling      10x 5s blue 10x 10x blue      1/2 kneeling       10x                                                                                                                                              Modalities              MH         10'                                Assessment:  Patient presents with thoracic paraspinal m spasms on the right and thoracic hypomobility with rotation left at T5-T8  Patient had no pain or discomfort post tx    Continue to progress with NMRE to allow for pain free return to all functional activities  Tolerated treatment well  Patient would benefit from continued PT  Plan: Continue per plan of care

## 2018-11-05 ENCOUNTER — OFFICE VISIT (OUTPATIENT)
Dept: PHYSICAL THERAPY | Facility: MEDICAL CENTER | Age: 43
End: 2018-11-05
Payer: COMMERCIAL

## 2018-11-05 DIAGNOSIS — M54.6 ACUTE MIDLINE THORACIC BACK PAIN: Primary | ICD-10-CM

## 2018-11-05 PROCEDURE — 97112 NEUROMUSCULAR REEDUCATION: CPT | Performed by: PHYSICAL THERAPIST

## 2018-11-05 PROCEDURE — 97110 THERAPEUTIC EXERCISES: CPT | Performed by: PHYSICAL THERAPIST

## 2018-11-05 PROCEDURE — 97140 MANUAL THERAPY 1/> REGIONS: CPT | Performed by: PHYSICAL THERAPIST

## 2018-11-05 NOTE — PROGRESS NOTES
Daily Note     Today's date: 2018  Patient name: Monique Vann  : 1975  MRN: [id number]  Referring provider: Juancarlos Tirado DO  Dx:   Encounter Diagnosis     ICD-10-CM    1  Acute midline thoracic back pain M54 6                   Subjective: Patient reports that he can pretty much do all activities without pain and it is much better than originally, but he has some pain at night      Objective: See treatment diary below    Precautions: None    Daily Treatment Diary     Manual  9/17 9/19 9/24 9/26 10/1 10/3 10/8 10/22 10/29 11/5   Seated CTJ Grade V AZ  GradeV AZ Grade V AZ Grade V AZ  AZ AZ     Thoracic mobs UPA for rotation L  AZ AZ AZ AZ AZ AZ AZ AZ AZ   STM/MFR                                           Exercise Diary                           Diaphragmatic breathing 2' 2' 2' 2' HEP     2'   DLS marches  51C 35T 20x HEP        LTR  20x 20x 20x 20x 20x and at 90/90 20x  20x 20x 20x   Dead bugs   2x10 3x10 3x10 3x10 3x10 3x10 3x10 3x10   Open books  10x b/l 10x b/l 10x b/l 10x b/l 10x b/l 10x b/l 10x b/l 10x 10x   Banded pull aparts on foam   30x blue 30x blue 30x blue 30x blue 30x purple 30x purple 30x purple 30x purple   Thoracic extensions foam   20x 20x 20x  Pillow 2' 20x 20x 20x   Thoracic rotations quadruped    10x b/l 10x b/l  10x b/l 10x b/l 10x b/l 10x b/l   Scap 4    W's GR 20x 5s W's GR 30x 5s W's GR 30x 5s W's GR 30x 5s W's Blue 20x 5s W's & rows blue 20x 5s W's & rows blue 30x 5s   Cat/camel     10x 10x 10x 10x 10x 10x   Quadruped opp arm/opp leg     2x10 2x10 2x10 2x10 2x10 3x10                Tall kneeling      10x 5s blue 10x 10x blue      1/2 kneeling       10x                                                                                                                                              Modalities              MH         10'                                Assessment:  Patient presents with thoracic paraspinal m spasms on the right and thoracic hypomobility with rotation left at T5-T8  Patient demonstrates no pain and improved thoracic rotation post tx  Recommended that patient return to physician to discuss night pain that improved for several weeks, but currently comes and goes randomly  Plan:  Patient has a follow up appointment with physician on Monday and will discuss tx plan at that time

## 2018-11-12 ENCOUNTER — OFFICE VISIT (OUTPATIENT)
Dept: FAMILY MEDICINE CLINIC | Facility: CLINIC | Age: 43
End: 2018-11-12
Payer: COMMERCIAL

## 2018-11-12 ENCOUNTER — APPOINTMENT (OUTPATIENT)
Dept: RADIOLOGY | Facility: MEDICAL CENTER | Age: 43
End: 2018-11-12
Payer: COMMERCIAL

## 2018-11-12 VITALS
DIASTOLIC BLOOD PRESSURE: 70 MMHG | TEMPERATURE: 96.9 F | HEIGHT: 74 IN | WEIGHT: 228 LBS | SYSTOLIC BLOOD PRESSURE: 116 MMHG | BODY MASS INDEX: 29.26 KG/M2

## 2018-11-12 DIAGNOSIS — G89.29 CHRONIC MIDLINE THORACIC BACK PAIN: ICD-10-CM

## 2018-11-12 DIAGNOSIS — M54.6 CHRONIC MIDLINE THORACIC BACK PAIN: ICD-10-CM

## 2018-11-12 DIAGNOSIS — Z86.79 HISTORY OF MYOCARDITIS: ICD-10-CM

## 2018-11-12 DIAGNOSIS — R07.89 ATYPICAL CHEST PAIN: Primary | ICD-10-CM

## 2018-11-12 PROBLEM — I51.4 MYOCARDITIS (HCC): Status: RESOLVED | Noted: 2017-08-01 | Resolved: 2018-11-12

## 2018-11-12 PROCEDURE — 1036F TOBACCO NON-USER: CPT | Performed by: FAMILY MEDICINE

## 2018-11-12 PROCEDURE — 99214 OFFICE O/P EST MOD 30 MIN: CPT | Performed by: FAMILY MEDICINE

## 2018-11-12 PROCEDURE — 93000 ELECTROCARDIOGRAM COMPLETE: CPT | Performed by: FAMILY MEDICINE

## 2018-11-12 PROCEDURE — 72072 X-RAY EXAM THORAC SPINE 3VWS: CPT

## 2018-11-12 PROCEDURE — 3008F BODY MASS INDEX DOCD: CPT | Performed by: FAMILY MEDICINE

## 2018-11-12 RX ORDER — DULOXETIN HYDROCHLORIDE 30 MG/1
30 CAPSULE, DELAYED RELEASE ORAL DAILY
Refills: 5 | COMMUNITY
Start: 2018-10-07 | End: 2018-12-17 | Stop reason: SDUPTHER

## 2018-11-12 NOTE — ASSESSMENT & PLAN NOTE
Patient has failed physical therapy  I will check x-rays, may need evaluation by Orthopedics/Sports Medicine

## 2018-11-12 NOTE — ASSESSMENT & PLAN NOTE
Likely anxiety related  EKG showed normal sinus rhythm with no real changes compared to last year, if anything it looks a little better  I will see him in 1 month  If the symptoms return with the palpitations, I would recommend he be revaluated by Cardiology

## 2018-11-12 NOTE — PROGRESS NOTES
Assessment/Plan:    Atypical chest pain  Likely anxiety related  EKG showed normal sinus rhythm with no real changes compared to last year, if anything it looks a little better  I will see him in 1 month  If the symptoms return with the palpitations, I would recommend he be revaluated by Cardiology  Chronic midline thoracic back pain  Patient has failed physical therapy  I will check x-rays, may need evaluation by Orthopedics/Sports Medicine  Diagnoses and all orders for this visit:    Atypical chest pain  -     Cancel: POCT ECG    Chronic midline thoracic back pain  -     XR spine thoracic 3 vw; Future    History of myocarditis    Other orders  -     DULoxetine (CYMBALTA) 30 mg delayed release capsule; Take 30 mg by mouth daily          Subjective:   Chief Complaint   Patient presents with    Anxiety     checkup    Hypertension     checkup        Patient ID: Aracelis Cheek is a 41 y o  male  Patient is a 22-year-old male presenting to the office complaining of palpitations that have bothered him twice in the last couple of weeks  Both episodes occurred while he was sleeping, 1st time he put on his wife's fit bit and his heart rate was 120, 2nd time his heart rate was in the high 80s  He denies having any bad dreams, any recreational drugs, 1st time he had had 1 beer, 2nd time no alcohol was involved  Denies having any over-the-counter medications in his system, does not drink caffeine after 12 o'clock in the afternoon  He restarted his duloxetine and has not had an episode since  Also, he continues to have difficulty with his thoracic pain  He has been going to physical therapy without resolution of his symptoms  Both of these symptoms seemed to have him quite concerned          The following portions of the patient's history were reviewed and updated as appropriate: allergies, current medications, past medical history, past social history and problem list     Review of Systems Constitutional: Negative for appetite change, chills, diaphoresis, fatigue, fever and unexpected weight change  HENT: Negative for congestion, ear pain, hearing loss, nosebleeds, postnasal drip, rhinorrhea, sinus pressure, sore throat, tinnitus and trouble swallowing  Eyes: Negative for photophobia, pain, discharge, redness, itching and visual disturbance  Respiratory: Positive for chest tightness (Has had a vague sensation of chest tightness, unrelated to the palpitations or his back pain  This has happened several times at rest, has not occurred while he was doing any physical activity  )  Negative for cough, shortness of breath and wheezing  Cardiovascular: Positive for chest pain and palpitations  Negative for leg swelling  Denies orthopnea , dyspnea on exertion   Gastrointestinal: Negative for abdominal distention, abdominal pain, blood in stool, constipation, diarrhea, nausea and vomiting  Endocrine: Negative  Genitourinary: Negative for difficulty urinating, dysuria, flank pain, frequency, hematuria and urgency  Denies nocturia , erectile dysfunction   Musculoskeletal: Negative for arthralgias, back pain, gait problem, joint swelling and myalgias  Skin: Negative for pallor, rash and wound  Denies skin lesions   Allergic/Immunologic: Negative for environmental allergies, food allergies and immunocompromised state  Neurological: Negative for dizziness, tremors, seizures, syncope, speech difficulty, weakness, numbness and headaches  Hematological: Negative for adenopathy  Does not bruise/bleed easily  Psychiatric/Behavioral: Negative for behavioral problems, confusion, sleep disturbance and suicidal ideas  The patient is nervous/anxious (Patient is quite concerned and anxious about his chest and back symptoms  )            Objective:      /70   Temp (!) 96 9 °F (36 1 °C)   Ht 6' 2" (1 88 m)   Wt 103 kg (228 lb)   BMI 29 27 kg/m²          Physical Exam Constitutional: He is oriented to person, place, and time  He appears well-developed and well-nourished  HENT:   Head: Normocephalic and atraumatic  Nose: Nose normal    Mouth/Throat: Oropharynx is clear and moist    Eyes: Pupils are equal, round, and reactive to light  Conjunctivae and EOM are normal    Neck: Normal range of motion  Neck supple  No JVD present  No tracheal deviation present  No thyromegaly present  Cardiovascular: Normal rate, regular rhythm and intact distal pulses  No murmur heard  Pulmonary/Chest: Effort normal and breath sounds normal  He has no wheezes  He has no rales  Abdominal: Soft  Bowel sounds are normal  He exhibits no mass  There is no tenderness  There is no rebound and no guarding  Musculoskeletal: He exhibits no edema, tenderness or deformity  Lymphadenopathy:     He has no cervical adenopathy  Neurological: He is alert and oriented to person, place, and time  He has normal reflexes  No cranial nerve deficit  He exhibits normal muscle tone  Coordination normal    Skin: Skin is warm and dry  No lesion and no rash noted  Nails show no clubbing  Psychiatric: He has a normal mood and affect  Judgment normal    Nursing note and vitals reviewed  EKG-normal sinus rhythm without acute ischemic changes  T-wave inversion in lead III is no longer present    Compared to 08/17

## 2018-11-14 ENCOUNTER — APPOINTMENT (OUTPATIENT)
Dept: PHYSICAL THERAPY | Facility: MEDICAL CENTER | Age: 43
End: 2018-11-14
Payer: COMMERCIAL

## 2018-12-17 ENCOUNTER — OFFICE VISIT (OUTPATIENT)
Dept: FAMILY MEDICINE CLINIC | Facility: CLINIC | Age: 43
End: 2018-12-17
Payer: COMMERCIAL

## 2018-12-17 VITALS
WEIGHT: 222 LBS | HEIGHT: 74 IN | BODY MASS INDEX: 28.49 KG/M2 | DIASTOLIC BLOOD PRESSURE: 78 MMHG | SYSTOLIC BLOOD PRESSURE: 120 MMHG

## 2018-12-17 DIAGNOSIS — F41.1 GENERALIZED ANXIETY DISORDER: Primary | ICD-10-CM

## 2018-12-17 DIAGNOSIS — R73.9 HYPERGLYCEMIA: ICD-10-CM

## 2018-12-17 DIAGNOSIS — E78.5 DYSLIPIDEMIA: ICD-10-CM

## 2018-12-17 DIAGNOSIS — Z86.79 HISTORY OF MYOCARDITIS: ICD-10-CM

## 2018-12-17 PROBLEM — M54.6 CHRONIC MIDLINE THORACIC BACK PAIN: Status: RESOLVED | Noted: 2018-10-01 | Resolved: 2018-12-17

## 2018-12-17 PROBLEM — R07.89 ATYPICAL CHEST PAIN: Status: RESOLVED | Noted: 2018-11-12 | Resolved: 2018-12-17

## 2018-12-17 PROBLEM — G89.29 CHRONIC MIDLINE THORACIC BACK PAIN: Status: RESOLVED | Noted: 2018-10-01 | Resolved: 2018-12-17

## 2018-12-17 PROCEDURE — 1036F TOBACCO NON-USER: CPT | Performed by: FAMILY MEDICINE

## 2018-12-17 PROCEDURE — 99214 OFFICE O/P EST MOD 30 MIN: CPT | Performed by: FAMILY MEDICINE

## 2018-12-17 PROCEDURE — 3008F BODY MASS INDEX DOCD: CPT | Performed by: FAMILY MEDICINE

## 2018-12-17 RX ORDER — DULOXETIN HYDROCHLORIDE 30 MG/1
30 CAPSULE, DELAYED RELEASE ORAL DAILY
Qty: 90 CAPSULE | Refills: 1 | Status: SHIPPED | OUTPATIENT
Start: 2018-12-17 | End: 2019-06-07 | Stop reason: SDUPTHER

## 2018-12-17 NOTE — ASSESSMENT & PLAN NOTE
Going to be re-evaluated by Cardiology in February  Continue metoprolol and losartan for now  I will see him in April

## 2018-12-17 NOTE — ASSESSMENT & PLAN NOTE
Continue Cymbalta for now  Will re-evaluate him in April  At that he will have seen Cardiology and hopefully he will have some peace of mind about his heart status

## 2018-12-17 NOTE — PROGRESS NOTES
Assessment/Plan:    Generalized anxiety disorder  Continue Cymbalta for now  Will re-evaluate him in April  At that he will have seen Cardiology and hopefully he will have some peace of mind about his heart status  Dyslipidemia  Will be due for lab work in April  Continue diet and exercise  Has lost 7 lb! History of myocarditis  Going to be re-evaluated by Cardiology in February  Continue metoprolol and losartan for now  I will see him in April  Diagnoses and all orders for this visit:    Generalized anxiety disorder  -     DULoxetine (CYMBALTA) 30 mg delayed release capsule; Take 1 capsule (30 mg total) by mouth daily    Dyslipidemia  -     CBC and differential; Future  -     Comprehensive metabolic panel; Future  -     Lipid panel; Future  -     TSH, 3rd generation; Future    History of myocarditis    Hyperglycemia  -     Comprehensive metabolic panel; Future  -     Hemoglobin A1C; Future          Subjective:   Chief Complaint   Patient presents with    Follow-up     chest pain  refill cymbalta 90 days        Patient ID: Russell Guillen is a 41 y o  male  Patient is a 59-year-old male presenting to the office for follow-up on his chest pain, thoracic pain, anxiety, myocarditis, and general health  Cardiology saw him earlier this fall, and he was doing quite well  The atypical chest pain he had last visit has disappeared  The upper back pain has improved but not completely dissipated with physical therapy  His anxiety is under good control with generic Cymbalta  The following portions of the patient's history were reviewed and updated as appropriate: allergies, current medications, past family history, past medical history, past social history, past surgical history and problem list     Review of Systems   Constitutional: Negative for appetite change, chills, diaphoresis, fatigue, fever and unexpected weight change     HENT: Negative for congestion, ear pain, hearing loss, nosebleeds, postnasal drip, rhinorrhea, sinus pressure, sore throat, tinnitus and trouble swallowing  Eyes: Negative for photophobia, pain, discharge, redness, itching and visual disturbance  Respiratory: Negative for cough, chest tightness, shortness of breath and wheezing  Cardiovascular: Negative for chest pain, palpitations and leg swelling  Denies orthopnea , dyspnea on exertion   Gastrointestinal: Negative for abdominal distention, abdominal pain, blood in stool, constipation, diarrhea, nausea and vomiting  Endocrine: Negative  Genitourinary: Negative for difficulty urinating, dysuria, flank pain, frequency, hematuria and urgency  Denies nocturia , erectile dysfunction   Musculoskeletal: Negative for arthralgias, back pain, gait problem, joint swelling and myalgias  Skin: Negative for pallor, rash and wound  Denies skin lesions   Allergic/Immunologic: Negative for environmental allergies, food allergies and immunocompromised state  Neurological: Negative for dizziness, tremors, seizures, syncope, speech difficulty, weakness, numbness and headaches  Hematological: Negative for adenopathy  Does not bruise/bleed easily  Psychiatric/Behavioral: Positive for decreased concentration and dysphoric mood  Negative for agitation, behavioral problems, confusion, sleep disturbance and suicidal ideas  The patient is nervous/anxious  Overall improved with Cymbalta  Objective:      /78   Ht 6' 2" (1 88 m)   Wt 101 kg (222 lb)   BMI 28 50 kg/m²          Physical Exam   Constitutional: He is oriented to person, place, and time  He appears well-developed and well-nourished  HENT:   Head: Normocephalic and atraumatic  Nose: Nose normal    Mouth/Throat: Oropharynx is clear and moist    Eyes: Pupils are equal, round, and reactive to light  Conjunctivae and EOM are normal    Neck: Normal range of motion  Neck supple  No JVD present   No tracheal deviation present  No thyromegaly present  Cardiovascular: Normal rate, regular rhythm and intact distal pulses  No murmur heard  Pulmonary/Chest: Effort normal and breath sounds normal  He has no wheezes  He has no rales  Abdominal: Soft  Bowel sounds are normal  He exhibits no mass  There is no tenderness  There is no rebound and no guarding  Musculoskeletal: He exhibits no edema, tenderness or deformity  Lymphadenopathy:     He has no cervical adenopathy  Neurological: He is alert and oriented to person, place, and time  He has normal reflexes  No cranial nerve deficit  He exhibits normal muscle tone  Coordination normal    Skin: Skin is warm and dry  No lesion and no rash noted  Nails show no clubbing  Psychiatric: Judgment normal  His mood appears anxious  His affect is not labile and not inappropriate  His speech is not rapid and/or pressured, not delayed and not tangential  He is not agitated, not aggressive, not slowed and not withdrawn  Thought content is not delusional  He does not express impulsivity  He does not exhibit a depressed mood  He exhibits normal recent memory  Nursing note and vitals reviewed

## 2019-01-27 ENCOUNTER — HOSPITAL ENCOUNTER (EMERGENCY)
Facility: HOSPITAL | Age: 44
Discharge: HOME | End: 2019-01-27
Attending: EMERGENCY MEDICINE | Admitting: EMERGENCY MEDICINE
Payer: COMMERCIAL

## 2019-01-27 VITALS
DIASTOLIC BLOOD PRESSURE: 98 MMHG | BODY MASS INDEX: 28.25 KG/M2 | WEIGHT: 220 LBS | SYSTOLIC BLOOD PRESSURE: 157 MMHG | RESPIRATION RATE: 18 BRPM | TEMPERATURE: 99.9 F | OXYGEN SATURATION: 96 % | HEART RATE: 85 BPM

## 2019-01-27 DIAGNOSIS — J06.9 URI (UPPER RESPIRATORY INFECTION): ICD-10-CM

## 2019-01-27 DIAGNOSIS — H69.80 EUSTACHIAN TUBE DYSFUNCTION: Primary | ICD-10-CM

## 2019-01-27 PROCEDURE — 99282 EMERGENCY DEPT VISIT SF MDM: CPT

## 2019-01-27 RX ORDER — FLUTICASONE PROPIONATE 50 MCG
1 SPRAY, SUSPENSION (ML) NASAL DAILY
Qty: 16 G | Refills: 0 | Status: SHIPPED | OUTPATIENT
Start: 2019-01-27 | End: 2020-01-08 | Stop reason: ALTCHOICE

## 2019-01-27 NOTE — ED PROVIDER NOTES
History  Chief Complaint   Patient presents with    Ear Fullness     patient c/o of right sided ear pain/fullness; patient states that he can hear his heart beat in his right ear      Patient is a 56-year-old male with a PMH of HTN and pericarditis who presents for evaluation of left ear fullness  He states that he started with symptoms yesterday  He describes a throbbing sensation  He states that he started initially a few days ago with sore throat, cough, and some runny nose  He states his daughter is sick with the flu so the whole family is taking Tamiflu  He is on day to 3 of taking Tamiflu  He denies any injury or trauma to the ear  There is no bleeding or drainage from the ear  He denies any hearing change  He denies fever, chills, nausea vomiting diarrhea, shortness breath, chest pain, wheezing, sinus pain or congestion  He has not been taking any medications for his symptoms  Prior to Admission Medications   Prescriptions Last Dose Informant Patient Reported? Taking? DULoxetine (CYMBALTA) 30 mg delayed release capsule   No No   Sig: Take 1 capsule (30 mg total) by mouth daily   losartan (COZAAR) 25 mg tablet   No No   Sig: Take 1 tablet (25 mg total) by mouth daily   metoprolol succinate (TOPROL-XL) 25 mg 24 hr tablet   No No   Sig: TAKE 1 TABLET BY MOUTH EVERY DAY      Facility-Administered Medications: None       Past Medical History:   Diagnosis Date    Leukopenia 07/08/2016    LAST ASSESSED    Pericarditis     Scrotal mass        Past Surgical History:   Procedure Laterality Date    KNEE SURGERY Bilateral     VASECTOMY         Family History   Problem Relation Age of Onset    Diabetes Father     Cancer Sister     Cancer Brother     Stroke Brother     Lung cancer Brother      I have reviewed and agree with the history as documented      Social History   Substance Use Topics    Smoking status: Never Smoker    Smokeless tobacco: Never Used    Alcohol use Yes      Comment: weekends        Review of Systems   Constitutional: Negative for chills and fever  HENT: Positive for ear pain  Negative for ear discharge, sinus pain and sinus pressure  Respiratory: Positive for cough  Negative for shortness of breath and wheezing  Cardiovascular: Negative for chest pain  Gastrointestinal: Negative for abdominal pain, diarrhea, nausea and vomiting  Musculoskeletal: Negative for neck pain and neck stiffness  Neurological: Negative for dizziness  All other systems reviewed and are negative  Physical Exam  Physical Exam   Constitutional: He is oriented to person, place, and time  Vital signs are normal  He appears well-developed and well-nourished  He is active  Non-toxic appearance  No distress  HENT:   Head: Normocephalic and atraumatic  Right Ear: Hearing, external ear and ear canal normal  No drainage  Tympanic membrane is not injected, not erythematous and not bulging  Left Ear: Hearing, external ear and ear canal normal  No drainage or tenderness  Tympanic membrane is not injected, not erythematous and not bulging  No middle ear effusion  Nose: Nose normal  Right sinus exhibits no maxillary sinus tenderness and no frontal sinus tenderness  Left sinus exhibits no maxillary sinus tenderness and no frontal sinus tenderness  Mouth/Throat: Uvula is midline, oropharynx is clear and moist and mucous membranes are normal  No oropharyngeal exudate, posterior oropharyngeal edema, posterior oropharyngeal erythema or tonsillar abscesses  Right TM with small serous effusion   Post nasal drip noted    Eyes: Conjunctivae and EOM are normal    Neck: Normal range of motion and full passive range of motion without pain  Neck supple  No spinous process tenderness present  Carotid bruit is not present  No neck rigidity  No edema, no erythema and normal range of motion present  Cardiovascular: Normal rate, regular rhythm and normal heart sounds    Exam reveals no gallop and no friction rub  No murmur heard  Pulmonary/Chest: Effort normal and breath sounds normal  No respiratory distress  He has no wheezes  He has no rales  Abdominal: Soft  Bowel sounds are normal    Neurological: He is alert and oriented to person, place, and time  Skin: Skin is warm and dry  He is not diaphoretic  Nursing note and vitals reviewed  Vital Signs  ED Triage Vitals [01/27/19 1106]   Temperature Pulse Respirations Blood Pressure SpO2   99 9 °F (37 7 °C) 85 18 157/98 96 %      Temp Source Heart Rate Source Patient Position - Orthostatic VS BP Location FiO2 (%)   Temporal Monitor Sitting Right arm --      Pain Score       No Pain           Vitals:    01/27/19 1106   BP: 157/98   Pulse: 85   Patient Position - Orthostatic VS: Sitting       Visual Acuity      ED Medications  Medications - No data to display    Diagnostic Studies  Results Reviewed     None                 No orders to display              Procedures  Procedures       Phone Contacts  ED Phone Contact    ED Course                               MDM  Number of Diagnoses or Management Options  Eustachian tube dysfunction:   URI (upper respiratory infection):     CritCare Time    Disposition  Final diagnoses:   Eustachian tube dysfunction   URI (upper respiratory infection)     Time reflects when diagnosis was documented in both MDM as applicable and the Disposition within this note     Time User Action Codes Description Comment    1/27/2019 12:50 PM Grzegorz Edgar Add [H69 80] Eustachian tube dysfunction     1/27/2019 12:51 PM Janes Conte Add [J06 9] URI (upper respiratory infection)       ED Disposition     ED Disposition Condition Comment    Discharge  Silvio Dominguez discharge to home/self care      Condition at discharge: Good        Follow-up Information     Follow up With Specialties Details Why Contact Info Additional Information    Efrain Adnrews DO Family Medicine Schedule an appointment as soon as possible for a visit in 1 day [facility] Emergency Medicine  If symptoms worsen or new symptoms arise as discussed  [address]  [phone] [place] [facility], [address]  , [place], [place], [address]          Discharge Medication List as of 1/27/2019 12:52 PM      START taking these medications    Details   fluticasone (FLONASE) 50 mcg/act nasal spray 1 spray into each nostril daily, Starting Sun 1/27/2019, Print         CONTINUE these medications which have NOT CHANGED    Details   DULoxetine (CYMBALTA) 30 mg delayed release capsule Take 1 capsule (30 mg total) by mouth daily, Starting Mon 12/17/2018, Normal      losartan (COZAAR) 25 mg tablet Take 1 tablet (25 mg total) by mouth daily, Starting Mon 8/13/2018, Normal      metoprolol succinate (TOPROL-XL) 25 mg 24 hr tablet TAKE 1 TABLET BY MOUTH EVERY DAY, Normal           No discharge procedures on file      ED Provider  Electronically Signed by           Nicolette Malik PA-C  01/27/19 4997

## 2019-01-27 NOTE — DISCHARGE INSTRUCTIONS
Eustachian Tube Dysfunction   WHAT YOU NEED TO KNOW:   What is eustachian tube dysfunction? Eustachian tube dysfunction (ETD) is a condition that prevents your eustachian tubes from opening properly  It can also cause them to become blocked  Eustachian tubes connect your middle ear to the back of your nose and throat  These tubes open and allow air to flow in and out when you sneeze, swallow, or yawn  What causes or increases my risk of ETD? ETD may be caused by swelling or buildup of mucus in your eustachian tubes  Allergies, a cold, or sinus infection can increase your risk for ETD  Smoking also increases your risk for ETD  What are the signs and symptoms of ETD? · Fullness or pressure in your ears    · Muffled hearing    · Pain in one or both ears    · Ringing in your ears    · Popping or clicking feeling in your ears    · Trouble keeping your balance  How is ETD diagnosed? Your healthcare provider will ask about your symptoms  He will examine your ears, your nose, and the back of your throat  He may also do a hearing test    How is ETD treated? Your ETD may get better on its own without any treatment  You may need any of the following:  · Exercises  such as swallowing, yawning, or chewing gum may help to open your eustachian tubes  Your healthcare provider may also recommend that you take a deep breath and then blow with your mouth shut and your nostrils pinched closed  · Air pressure devices  push air into your nose and eustachian tubes to help relieve air pressure in your ear  · Treatment for allergies  such as decongestants, antihistamines, and nasal steroids may improve ETD  They may help decrease swelling of the eustachian tubes  · Ear tubes  may help to keep your middle ear open  During this procedure, your healthcare provider will cut a small hole in your eardrum  · A myringotomy  is procedure to make a small cut in your eardrum and suction out fluid from your middle ear  · Tuboplasty  is a procedure to widen your eustachian tubes  When should I contact my healthcare provider? · Your symptoms do not improve or get worse  · You have a fever  · You have any hearing loss  · You have questions or concerns about your condition or care  CARE AGREEMENT:   You have the right to help plan your care  Learn about your health condition and how it may be treated  Discuss treatment options with your caregivers to decide what care you want to receive  You always have the right to refuse treatment  The above information is an  only  It is not intended as medical advice for individual conditions or treatments  Talk to your doctor, nurse or pharmacist before following any medical regimen to see if it is safe and effective for you  © 2017 [employer]  Information is for End User's use only and may not be sold, redistributed or otherwise used for commercial purposes  All illustrations and images included in CareNotes® are the copyrighted property of [employer]  or [employer]  Upper Respiratory Infection   WHAT YOU NEED TO KNOW:   An upper respiratory infection is also called the common cold  It is an infection that can affect your nose, throat, ears, and sinuses  For healthy people, the common cold is usually not serious and does not need special treatment  Cold symptoms are usually worst for the first 3 to 5 days  Most people get better in 7 to 14 days  You may continue to cough for 2 to 3 weeks  Colds are caused by viruses and do not get better with antibiotics  DISCHARGE INSTRUCTIONS:   Return to the emergency department if:   · You have chest pain or trouble breathing  Contact your healthcare provider if:   · You have a fever over 102ºF (39°C)  · Your sore throat gets worse or you see white or yellow spots in your throat  · Your symptoms get worse after 3 to 5 days or your cold is not better in 14 days      · You have a rash anywhere on your skin  · You have large, tender lumps in your neck  · You have thick, green or yellow drainage from your nose  · You cough up thick yellow, green, or bloody mucus  · You have vomiting for more than 24 hours and cannot keep fluids down  · You have a bad earache  · You have questions or concerns about your condition or care  Medicines: You may need any of the following:  · Decongestants  help reduce nasal congestion and help you breathe more easily  If you take decongestant pills, they may make you feel restless or cause problems with your sleep  Do not use decongestant sprays for more than a few days  · Cough suppressants  help reduce coughing  Ask your healthcare provider which type of cough medicine is best for you  · NSAIDs , such as ibuprofen, help decrease swelling, pain, and fever  NSAIDs can cause stomach bleeding or kidney problems in certain people  If you take blood thinner medicine, always ask your healthcare provider if NSAIDs are safe for you  Always read the medicine label and follow directions  · Acetaminophen  decreases pain and fever  It is available without a doctor's order  Ask how much to take and how often to take it  Follow directions  Read the labels of all other medicines you are using to see if they also contain acetaminophen, or ask your doctor or pharmacist  Acetaminophen can cause liver damage if not taken correctly  Do not use more than 4 grams (4,000 milligrams) total of acetaminophen in one day  · Take your medicine as directed  Contact your healthcare provider if you think your medicine is not helping or if you have side effects  Tell him or her if you are allergic to any medicine  Keep a list of the medicines, vitamins, and herbs you take  Include the amounts, and when and why you take them  Bring the list or the pill bottles to follow-up visits  Carry your medicine list with you in case of an emergency    Follow up with your healthcare provider as directed:  Write down your questions so you remember to ask them during your visits  Self-care:   · Rest as much as possible  Slowly start to do more each day  · Drink more liquids as directed  Liquids will help thin and loosen mucus so you can cough it up  Liquids will also help prevent dehydration  Liquids that help prevent dehydration include water, fruit juice, and broth  Do not drink liquids that contain caffeine  Caffeine can increase your risk for dehydration  Ask your healthcare provider how much liquid to drink each day  · Soothe a sore throat  Gargle with warm salt water  This helps your sore throat feel better  Make salt water by dissolving ¼ teaspoon salt in 1 cup warm water  You may also suck on hard candy or throat lozenges  You may use a sore throat spray  · Use a humidifier or vaporizer  Use a cool mist humidifier or a vaporizer to increase air moisture in your home  This may make it easier for you to breathe and help decrease your cough  · Use saline nasal drops as directed  These help relieve congestion  · Apply petroleum-based jelly around the outside of your nostrils  This can decrease irritation from blowing your nose  · Do not smoke  Nicotine and other chemicals in cigarettes and cigars can make your symptoms worse  They can also cause infections such as bronchitis or pneumonia  Ask your healthcare provider for information if you currently smoke and need help to quit  E-cigarettes or smokeless tobacco still contain nicotine  Talk to your healthcare provider before you use these products  Prevent spreading your cold to others:   · Try to stay away from other people during the first 2 to 3 days of your cold when it is more easily spread  · Do not share food or drinks  · Do not share hand towels with household members  · Wash your hands often, especially after you blow your nose   Turn away from other people and cover your mouth and nose with a tissue when you sneeze or cough  © 2017 [employer] Information is for End User's use only and may not be sold, redistributed or otherwise used for commercial purposes  All illustrations and images included in CareNotes® are the copyrighted property of [employer]  or [employer]  The above information is an  only  It is not intended as medical advice for individual conditions or treatments  Talk to your doctor, nurse or pharmacist before following any medical regimen to see if it is safe and effective for you

## 2019-02-08 ENCOUNTER — OFFICE VISIT (OUTPATIENT)
Dept: CARDIOLOGY CLINIC | Facility: CLINIC | Age: 44
End: 2019-02-08
Payer: COMMERCIAL

## 2019-02-08 VITALS
BODY MASS INDEX: 28.11 KG/M2 | WEIGHT: 219 LBS | OXYGEN SATURATION: 98 % | SYSTOLIC BLOOD PRESSURE: 100 MMHG | DIASTOLIC BLOOD PRESSURE: 70 MMHG | HEART RATE: 80 BPM | HEIGHT: 74 IN

## 2019-02-08 DIAGNOSIS — E78.5 HYPERLIPIDEMIA, UNSPECIFIED HYPERLIPIDEMIA TYPE: Primary | ICD-10-CM

## 2019-02-08 DIAGNOSIS — I42.9 CARDIOMYOPATHY, UNSPECIFIED TYPE (HCC): ICD-10-CM

## 2019-02-08 PROCEDURE — 99214 OFFICE O/P EST MOD 30 MIN: CPT | Performed by: INTERNAL MEDICINE

## 2019-02-08 NOTE — PROGRESS NOTES
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/2018    Hyperglycemia 10/01/2018    Dyslipidemia 10/01/2018    Acute meniscal tear, medial 08/13/2018    Generalized anxiety disorder 09/01/2017     # Myopericarditis with recovered LVEF by TTE w/ mildly reduced RVSF  Clean coronaries on cardiac cath  TTE w/o focal WMA  Euvolemic on exam  Symptomatically appears to be improving  cMRI shows LVEF --45-50% w/ top normal RV size, but mildly reduced function  No e/o scar, inflammation, or infiltrative disease  Pericarditis symptoms resolved x ~1 month now  LVEF has normalized, but RVSF still reduced as noted below  Now RV function normalized  Due to reduction in function we will do the following:   Diag:  --TTE 2/1/18 shows LVEF>55%, grade 1 diastology, mild RV dilation, grade 1 diastology, no sig valvular disease  --CBC, Chem 10 and A1c 6 1 all ok  Lipid panel shows elevated LDL ~138 w/   --TTE 7/2018: LVEF ~50%, normal RV    To do:  --Repeat TTE  --Repeat lipid panel    Therapeutic:   --Continue metoprolol succinate 12 5 mg PO BID   --Continue losartan 25 mg PO daily   --Continue to hold NSAIDs and colchicine  --Continue full activity  --Continue duloxetine      # GERD: Continue prilosec for now  # Anxiety: Patient has a component of anxiety as well related to his diagnosis  States it is improving as well   # HLD    RTC in 6 month    HPI: Very pleasant 41year old gentleman w/ no PMHx p/w acute onset retrosternal pressure to [facility] on 7/27/17  Zofran and viscous lidocaine given for nausea/emesis and possible reflux, respectively without resolution  Trop trend 0 00->0 25->11 4  S/p cardiac cath with no coronary disease  Symptoms likely from myocarditis +/- myopericarditis vs vasospasm  ECG ok  Seen by Dr Sarbjit Miranda in f/u   The patient was started on prilosec for GERD with improvement in throat tightness and switched from lisinopril to losartan  He states that he continues to feel a little bit of pressure in the chest when he leans backwards  He states his breathing was worse since discharge, so he was switched to losartan 25 mg PO daily  He is taking prilosec and thinks this may have helped a bit  He thinks it is getting better and better  He had his cMRI on 8/14/2017 w/ results pending  Today he returns for f/u, 9/21/2017, he states he continues to feel a little bit of pressure in the chest when he leans backwards  Last week he felt a little better, and then he worked hard at work, and it exacerbated it over the weekend  He sleeps sitting up due to the pain  Today he returns for f/u, 10/17/2017, he states he continues to feel a pressure in the chest when he leans backwards  He states that the severity is decreasing  He continues to sleep sitting up  Today he returns for f/u, 11/27/2017, he states the radiating pain is gone  He gets sharp pains with activity; he feels that it is in his ribs  When he lays down he the pressure in the chest is still there, but feels like it is improving  He fell asleep on his stomach  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 without symptoms  Energy improved  Today he returns for f/u, 1/23/2018, he states the radiating pain is gone  He gets sharp pains with activity but much less frequently now; he feels that it is in his ribs but markedly improved  When he lays down he still has pressure in the chest but significantly improved  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 without symptoms, but with strenuous activity he feels it again  Energy improved  Today he returns for f/u, 5/16/18  He did not go to [facility] as it was too far  He states that he still gets the R sided discomfort  He can sleep through the night fine, and occasionally has pain under the ribs   However, he sleeps on his stomach  Now when he lays flat on his back, the chest pains completely resolved  Now at work he is fine during work, but at night sometimes he feels a twinge  This is very rare  Today he returns for f/u, 6/25/18  He states he feels well  He played soccer with his kids for about 20-25 minutes  He developed some mild chest discomfort at that time but it resolved quickly with rest      Today, 8/24/18, he returns for f/u  He states he feels well  Has rare heaviness in the chest when laying down  However, this is unlike prior  Today, 2/8/19, he returns for f/u  He states he had a couple days where he had some sharp CP, but then it self resolved  Non-positional  However, other than those three days, he feels back to normal     Past Medical History:   Diagnosis Date    Leukopenia 07/08/2016    LAST ASSESSED    Pericarditis     Scrotal mass      Review of Systems - 12 point ROS was done and is negative, except as noted above  No Known Allergies      Current Outpatient Prescriptions:     DULoxetine (CYMBALTA) 30 mg delayed release capsule, Take 1 capsule (30 mg total) by mouth daily, Disp: 90 capsule, Rfl: 1    losartan (COZAAR) 25 mg tablet, Take 1 tablet (25 mg total) by mouth daily, Disp: 90 tablet, Rfl: 3    metoprolol succinate (TOPROL-XL) 25 mg 24 hr tablet, TAKE 1 TABLET BY MOUTH EVERY DAY, Disp: 30 tablet, Rfl: 1    fluticasone (FLONASE) 50 mcg/act nasal spray, 1 spray into each nostril daily (Patient not taking: Reported on 2/8/2019 ), Disp: 16 g, Rfl: 0    Social History     Social History    Marital status: /Civil Union     Spouse name: N/A    Number of children: N/A    Years of education: N/A     Occupational History    Not on file       Social History Main Topics    Smoking status: Never Smoker    Smokeless tobacco: Never Used    Alcohol use Yes      Comment: weekends    Drug use: No    Sexual activity: Not on file     Other Topics Concern    Not on file     Social History Narrative    PER ALLSCRIPTS: ALCOHOLISM IN RECOVERY       Family History   Problem Relation Age of Onset    Diabetes Father     Cancer Sister     Cancer Brother     Stroke Brother     Lung cancer Brother        Physical Exam:    Vitals: Blood pressure 100/70, pulse 80, height 6' 2" (1 88 m), weight 99 3 kg (219 lb), SpO2 98 %  , Body mass index is 28 12 kg/m² ,   Wt Readings from Last 3 Encounters:   02/08/19 99 3 kg (219 lb)   01/27/19 99 8 kg (220 lb)   12/17/18 101 kg (222 lb)     Vitals:    02/08/19 1642   BP: 100/70   BP Location: Right arm   Patient Position: Sitting   Cuff Size: Standard   Pulse: 80   SpO2: 98%   Weight: 99 3 kg (219 lb)   Height: 6' 2" (1 88 m)       GEN: Errol Kumar appears well, alert and oriented x 3, pleasant and cooperative   HEENT: pupils equal, round, and reactive to light; extraocular muscles intact  NECK: supple, no carotid bruits   HEART: regular rhythm, normal S1 and S2, no murmurs, clicks, gallops or rubs, JVP is    LUNGS: clear to auscultation bilaterally; no wheezes, rales, or rhonchi   ABDOMEN: normal bowel sounds, soft, no tenderness, no distention  EXTREMITIES: peripheral pulses normal; no clubbing, cyanosis, or edema  NEURO: no focal findings   SKIN: normal without suspicious lesions on exposed skin    Labs & Results:  Lab Results   Component Value Date    WBC 3 95 (L) 10/21/2018    HGB 14 3 10/21/2018    HCT 44 1 10/21/2018    MCV 89 10/21/2018     10/21/2018       Chemistry        Component Value Date/Time    K 4 4 10/21/2018 0918     10/21/2018 0918    CO2 30 10/21/2018 0918    BUN 14 10/21/2018 0918    CREATININE 0 93 10/21/2018 0918        Component Value Date/Time    CALCIUM 8 8 10/21/2018 0918    ALKPHOS 86 10/21/2018 0918    AST 20 10/21/2018 0918    ALT 49 10/21/2018 0918        EKG personally reviewed by Kerby Cushing, MD      Counseling / Coordination of Care  Total floor / unit time spent today 40 minutes    Greater than 50% of total time was spent with the patient and / or family counseling and / or coordination of care  A description of the counseling / coordination of care: 20 min  Thank you for the opportunity to participate in the care of this patient      Milagros Roberts MD, PhD   Sandi Jeffries

## 2019-02-08 NOTE — LETTER
February 8, 2019     Edelmira Valero, DO  [facility]  [address] [place] [address]    Patient: Sharilyn Favre   YOB: 1975   Date of Visit: 2/8/2019       Dear Dr Lulu Matthews: Thank you for referring Sharilyn Favre to me for evaluation  Below are my notes for this consultation  If you have questions, please do not hesitate to call me  I look forward to following your patient along with you  Sincerely,        Darío Foss MD        CC: No Recipients  Darío Foss MD  2/8/2019  4:57 PM  Sign at close encounter    Heart Failure Outpatient Progress Note - Silvio Dominguez 41 y o  male MRN: [id number]    @ Encounter: [id number]      Assessment/Plan:    Patient Active Problem List    Diagnosis Date Noted    History of myocarditis 11/12/2018    Hyperglycemia 10/01/2018    Dyslipidemia 10/01/2018    Acute meniscal tear, medial 08/13/2018    Generalized anxiety disorder 09/01/2017     # Myopericarditis with recovered LVEF by TTE w/ mildly reduced RVSF  Clean coronaries on cardiac cath  TTE w/o focal WMA  Euvolemic on exam  Symptomatically appears to be improving  cMRI shows LVEF --45-50% w/ top normal RV size, but mildly reduced function  No e/o scar, inflammation, or infiltrative disease  Pericarditis symptoms resolved x ~1 month now  LVEF has normalized, but RVSF still reduced as noted below  Now RV function normalized  Due to reduction in function we will do the following:   Diag:  --TTE 2/1/18 shows LVEF>55%, grade 1 diastology, mild RV dilation, grade 1 diastology, no sig valvular disease  --CBC, Chem 10 and A1c 6 1 all ok   Lipid panel shows elevated LDL ~138 w/   --TTE 7/2018: LVEF ~50%, normal RV    To do:  --Repeat TTE  --Repeat lipid panel    Therapeutic:   --Continue metoprolol succinate 12 5 mg PO BID   --Continue losartan 25 mg PO daily   --Continue to hold NSAIDs and colchicine  --Continue full activity  --Continue duloxetine      # GERD: Continue prilosec for now  # Anxiety: Patient has a component of anxiety as well related to his diagnosis  States it is improving as well   # HLD    RTC in 6 month    HPI: Very pleasant 41year old gentleman w/ no PMHx p/w acute onset retrosternal pressure to [facility] on 7/27/17  Zofran and viscous lidocaine given for nausea/emesis and possible reflux, respectively without resolution  Trop trend 0 00->0 25->11 4  S/p cardiac cath with no coronary disease  Symptoms likely from myocarditis +/- myopericarditis vs vasospasm  ECG ok  Seen by Dr Rizwan Mccauley in f/u  The patient was started on prilosec for GERD with improvement in throat tightness and switched from lisinopril to losartan  He states that he continues to feel a little bit of pressure in the chest when he leans backwards  He states his breathing was worse since discharge, so he was switched to losartan 25 mg PO daily  He is taking prilosec and thinks this may have helped a bit  He thinks it is getting better and better  He had his cMRI on 8/14/2017 w/ results pending  Today he returns for f/u, 9/21/2017, he states he continues to feel a little bit of pressure in the chest when he leans backwards  Last week he felt a little better, and then he worked hard at work, and it exacerbated it over the weekend  He sleeps sitting up due to the pain  Today he returns for f/u, 10/17/2017, he states he continues to feel a pressure in the chest when he leans backwards  He states that the severity is decreasing  He continues to sleep sitting up  Today he returns for f/u, 11/27/2017, he states the radiating pain is gone  He gets sharp pains with activity; he feels that it is in his ribs  When he lays down he the pressure in the chest is still there, but feels like it is improving  He fell asleep on his stomach  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 without symptoms  Energy improved  Today he returns for f/u, 1/23/2018, he states the radiating pain is gone   He gets sharp pains with activity but much less frequently now; he feels that it is in his ribs but markedly improved  When he lays down he still has pressure in the chest but significantly improved  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 without symptoms, but with strenuous activity he feels it again  Energy improved  Today he returns for f/u, 5/16/18  He did not go to [facility] as it was too far  He states that he still gets the R sided discomfort  He can sleep through the night fine, and occasionally has pain under the ribs  However, he sleeps on his stomach  Now when he lays flat on his back, the chest pains completely resolved  Now at work he is fine during work, but at night sometimes he feels a twinge  This is very rare  Today he returns for f/u, 6/25/18  He states he feels well  He played soccer with his kids for about 20-25 minutes  He developed some mild chest discomfort at that time but it resolved quickly with rest      Today, 8/24/18, he returns for f/u  He states he feels well  Has rare heaviness in the chest when laying down  However, this is unlike prior  Today, 2/8/19, he returns for f/u  He states he had a couple days where he had some sharp CP, but then it self resolved  Non-positional  However, other than those three days, he feels back to normal     Past Medical History:   Diagnosis Date    Leukopenia 07/08/2016    LAST ASSESSED    Pericarditis     Scrotal mass      Review of Systems - 12 point ROS was done and is negative, except as noted above       No Known Allergies      Current Outpatient Prescriptions:     DULoxetine (CYMBALTA) 30 mg delayed release capsule, Take 1 capsule (30 mg total) by mouth daily, Disp: 90 capsule, Rfl: 1    losartan (COZAAR) 25 mg tablet, Take 1 tablet (25 mg total) by mouth daily, Disp: 90 tablet, Rfl: 3    metoprolol succinate (TOPROL-XL) 25 mg 24 hr tablet, TAKE 1 TABLET BY MOUTH EVERY DAY, Disp: 30 tablet, Rfl: 1    fluticasone (FLONASE) 50 mcg/act nasal spray, 1 spray into each nostril daily (Patient not taking: Reported on 2/8/2019 ), Disp: 16 g, Rfl: 0    Social History     Social History    Marital status: /Civil Union     Spouse name: N/A    Number of children: N/A    Years of education: N/A     Occupational History    Not on file  Social History Main Topics    Smoking status: Never Smoker    Smokeless tobacco: Never Used    Alcohol use Yes      Comment: weekends    Drug use: No    Sexual activity: Not on file     Other Topics Concern    Not on file     Social History Narrative    PER ALLSCRIPTS: ALCOHOLISM IN RECOVERY       Family History   Problem Relation Age of Onset    Diabetes Father     Cancer Sister     Cancer Brother     Stroke Brother     Lung cancer Brother        Physical Exam:    Vitals: Blood pressure 100/70, pulse 80, height 6' 2" (1 88 m), weight 99 3 kg (219 lb), SpO2 98 %  , Body mass index is 28 12 kg/m² ,   Wt Readings from Last 3 Encounters:   02/08/19 99 3 kg (219 lb)   01/27/19 99 8 kg (220 lb)   12/17/18 101 kg (222 lb)     Vitals:    02/08/19 1642   BP: 100/70   BP Location: Right arm   Patient Position: Sitting   Cuff Size: Standard   Pulse: 80   SpO2: 98%   Weight: 99 3 kg (219 lb)   Height: 6' 2" (1 88 m)       GEN: Malathi Licea appears well, alert and oriented x 3, pleasant and cooperative   HEENT: pupils equal, round, and reactive to light; extraocular muscles intact  NECK: supple, no carotid bruits   HEART: regular rhythm, normal S1 and S2, no murmurs, clicks, gallops or rubs, JVP is    LUNGS: clear to auscultation bilaterally; no wheezes, rales, or rhonchi   ABDOMEN: normal bowel sounds, soft, no tenderness, no distention  EXTREMITIES: peripheral pulses normal; no clubbing, cyanosis, or edema  NEURO: no focal findings   SKIN: normal without suspicious lesions on exposed skin    Labs & Results:  Lab Results   Component Value Date    WBC 3 95 (L) 10/21/2018    HGB 14 3 10/21/2018    HCT 44 1 10/21/2018    MCV 89 10/21/2018     10/21/2018       Chemistry        Component Value Date/Time    K 4 4 10/21/2018 0918     10/21/2018 0918    CO2 30 10/21/2018 0918    BUN 14 10/21/2018 0918    CREATININE 0 93 10/21/2018 0918        Component Value Date/Time    CALCIUM 8 8 10/21/2018 0918    ALKPHOS 86 10/21/2018 0918    AST 20 10/21/2018 0918    ALT 49 10/21/2018 0918        EKG personally reviewed by Ely Espinoza MD      Counseling / Coordination of Care  Total floor / unit time spent today 40 minutes  Greater than 50% of total time was spent with the patient and / or family counseling and / or coordination of care  A description of the counseling / coordination of care: 20 min  Thank you for the opportunity to participate in the care of this patient      Ely Espinoza MD, PhD   Charlee Benitez

## 2019-02-13 ENCOUNTER — APPOINTMENT (OUTPATIENT)
Dept: LAB | Facility: MEDICAL CENTER | Age: 44
End: 2019-02-13
Payer: COMMERCIAL

## 2019-02-13 DIAGNOSIS — E78.5 HYPERLIPIDEMIA, UNSPECIFIED HYPERLIPIDEMIA TYPE: ICD-10-CM

## 2019-02-13 LAB
CHOLEST SERPL-MCNC: 226 MG/DL (ref 50–200)
HDLC SERPL-MCNC: 52 MG/DL (ref 40–60)
LDLC SERPL CALC-MCNC: 161 MG/DL (ref 0–100)
TRIGL SERPL-MCNC: 64 MG/DL

## 2019-02-13 PROCEDURE — 80061 LIPID PANEL: CPT

## 2019-02-13 PROCEDURE — 36415 COLL VENOUS BLD VENIPUNCTURE: CPT

## 2019-02-18 ENCOUNTER — TELEPHONE (OUTPATIENT)
Dept: CARDIOLOGY CLINIC | Facility: CLINIC | Age: 44
End: 2019-02-18

## 2019-02-18 NOTE — TELEPHONE ENCOUNTER
Spoke with Silvio,about his Cholesterol level is elevated again and that Dr Xiao wants to start talking about cholesterol meds he said ok in a melancholic way  I adv him to keep continuing his diet and exercise  He said ok      ===View-only below this line===    ----- Message -----  From: Jessica Lawler MD  Sent: 2/18/2019  11:30 AM  To: Gene Perez MA    The cholesterol panel is elevated again  We can discuss starting a cholesterol medication at the next visit  Continue diet and exercise!

## 2019-03-15 ENCOUNTER — HOSPITAL ENCOUNTER (OUTPATIENT)
Dept: NON INVASIVE DIAGNOSTICS | Facility: CLINIC | Age: 44
Discharge: HOME | End: 2019-03-15
Payer: COMMERCIAL

## 2019-03-15 DIAGNOSIS — I42.9 CARDIOMYOPATHY, UNSPECIFIED TYPE (HCC): ICD-10-CM

## 2019-03-15 PROCEDURE — 93306 TTE W/DOPPLER COMPLETE: CPT

## 2019-03-15 PROCEDURE — 93306 TTE W/DOPPLER COMPLETE: CPT | Performed by: INTERNAL MEDICINE

## 2019-03-18 DIAGNOSIS — I15.9 SECONDARY HYPERTENSION: ICD-10-CM

## 2019-03-18 RX ORDER — METOPROLOL SUCCINATE 25 MG/1
25 TABLET, EXTENDED RELEASE ORAL DAILY
Qty: 30 TABLET | Refills: 6 | Status: SHIPPED | OUTPATIENT
Start: 2019-03-18 | End: 2019-09-17 | Stop reason: SDUPTHER

## 2019-03-20 ENCOUNTER — TELEPHONE (OUTPATIENT)
Dept: CARDIOLOGY CLINIC | Facility: CLINIC | Age: 44
End: 2019-03-20

## 2019-03-20 NOTE — TELEPHONE ENCOUNTER
Spoke with patient,    Provided the info on his echo results  g2g    ===View-only below this line===    ----- Message -----  From: Edwin Butcher MD  Sent: 3/18/2019   9:52 AM  To: Amelia Dominguez MA    Echo is same as prior  Ejection fraction ~50% (normal ~55%)  Continue current medications  No changes  Will see you in followup

## 2019-04-15 ENCOUNTER — OFFICE VISIT (OUTPATIENT)
Dept: FAMILY MEDICINE CLINIC | Facility: CLINIC | Age: 44
End: 2019-04-15
Payer: COMMERCIAL

## 2019-04-15 VITALS
SYSTOLIC BLOOD PRESSURE: 100 MMHG | WEIGHT: 220.5 LBS | HEIGHT: 74 IN | DIASTOLIC BLOOD PRESSURE: 76 MMHG | BODY MASS INDEX: 28.3 KG/M2

## 2019-04-15 DIAGNOSIS — M77.52 BURSITIS OF LEFT ANKLE: ICD-10-CM

## 2019-04-15 DIAGNOSIS — Z86.79 HISTORY OF MYOCARDITIS: ICD-10-CM

## 2019-04-15 DIAGNOSIS — E78.5 DYSLIPIDEMIA: ICD-10-CM

## 2019-04-15 DIAGNOSIS — F41.1 GENERALIZED ANXIETY DISORDER: Primary | ICD-10-CM

## 2019-04-15 DIAGNOSIS — R73.9 HYPERGLYCEMIA: ICD-10-CM

## 2019-04-15 PROCEDURE — 99214 OFFICE O/P EST MOD 30 MIN: CPT | Performed by: FAMILY MEDICINE

## 2019-04-15 RX ORDER — PREDNISONE 10 MG/1
TABLET ORAL
Qty: 15 TABLET | Refills: 0 | Status: SHIPPED | OUTPATIENT
Start: 2019-04-15 | End: 2019-09-17 | Stop reason: ALTCHOICE

## 2019-04-16 DIAGNOSIS — I10 HYPERTENSION, UNSPECIFIED TYPE: ICD-10-CM

## 2019-04-16 RX ORDER — LOSARTAN POTASSIUM 25 MG/1
TABLET ORAL
Qty: 90 TABLET | Refills: 3 | Status: SHIPPED | OUTPATIENT
Start: 2019-04-16 | End: 2020-02-06 | Stop reason: SDUPTHER

## 2019-05-06 ENCOUNTER — CLINICAL SUPPORT (OUTPATIENT)
Dept: FAMILY MEDICINE CLINIC | Facility: CLINIC | Age: 44
End: 2019-05-06

## 2019-05-06 VITALS — SYSTOLIC BLOOD PRESSURE: 116 MMHG | DIASTOLIC BLOOD PRESSURE: 70 MMHG

## 2019-05-06 DIAGNOSIS — I10 ESSENTIAL HYPERTENSION: Primary | ICD-10-CM

## 2019-06-07 DIAGNOSIS — F41.1 GENERALIZED ANXIETY DISORDER: ICD-10-CM

## 2019-06-07 RX ORDER — DULOXETIN HYDROCHLORIDE 30 MG/1
CAPSULE, DELAYED RELEASE ORAL
Qty: 90 CAPSULE | Refills: 1 | Status: SHIPPED | OUTPATIENT
Start: 2019-06-07 | End: 2019-12-06 | Stop reason: SDUPTHER

## 2019-09-17 ENCOUNTER — OFFICE VISIT (OUTPATIENT)
Dept: CARDIOLOGY CLINIC | Facility: CLINIC | Age: 44
End: 2019-09-17
Payer: COMMERCIAL

## 2019-09-17 VITALS
DIASTOLIC BLOOD PRESSURE: 90 MMHG | SYSTOLIC BLOOD PRESSURE: 130 MMHG | HEIGHT: 74 IN | HEART RATE: 75 BPM | WEIGHT: 226 LBS | BODY MASS INDEX: 29 KG/M2 | OXYGEN SATURATION: 99 %

## 2019-09-17 DIAGNOSIS — I15.9 SECONDARY HYPERTENSION: ICD-10-CM

## 2019-09-17 DIAGNOSIS — I51.4 MYOCARDITIS, UNSPECIFIED CHRONICITY, UNSPECIFIED MYOCARDITIS TYPE (HCC): ICD-10-CM

## 2019-09-17 DIAGNOSIS — E78.5 HYPERLIPIDEMIA, UNSPECIFIED HYPERLIPIDEMIA TYPE: Primary | ICD-10-CM

## 2019-09-17 PROCEDURE — 99214 OFFICE O/P EST MOD 30 MIN: CPT | Performed by: INTERNAL MEDICINE

## 2019-09-17 RX ORDER — METOPROLOL SUCCINATE 25 MG/1
25 TABLET, EXTENDED RELEASE ORAL DAILY
Qty: 90 TABLET | Refills: 3 | Status: SHIPPED | OUTPATIENT
Start: 2019-09-17 | End: 2020-10-16 | Stop reason: SDUPTHER

## 2019-09-17 NOTE — PROGRESS NOTES
Advanced Heart Failure/Pulmonary Hypertension Outpatient Progress Note - Silvio Dominguez 45 y o  male MRN: [id number]    @ Encounter: [id number]  Assessment/Plan:    Patient Active Problem List    Diagnosis Date Noted    History of myocarditis 11/12/2018    Hyperglycemia 10/01/2018    Dyslipidemia 10/01/2018    Acute meniscal tear, medial 08/13/2018    Generalized anxiety disorder 09/01/2017     # Myopericarditis with recovered LVEF by TTE w/ mildly reduced RVSF  Clean coronaries on cardiac cath  TTE w/o focal WMA  Euvolemic on exam  Symptomatically appears to be improving  cMRI shows LVEF --45-50% w/ top normal RV size, but mildly reduced function  No e/o scar, inflammation, or infiltrative disease  Pericarditis symptoms resolved x ~1 month now  LVEF has normalized, but RVSF still reduced as noted below  Now RV function normalized  Now asymptomatic  NYHA I symptoms  Due to reduction in function we will do the following :   Diag:  --TTE 2/1/18 shows LVEF>55%, grade 1 diastology, mild RV dilation, grade 1 diastology, no sig valvular disease  --CBC, Chem 10 and A1c 6 1 all ok  Lipid panel shows elevated LDL ~138 w/   --TTE 7/2018: LVEF ~50%, normal RV  --TTE 3/15/19: LVEF ~50%, borderline RV (unchanged from prior)    Therapeutic:   --Continue metoprolol succinate 12 5 mg PO BID   --Continue losartan 25 mg PO daily   --Continue to hold NSAIDs and colchicine  --Continue full activity  --Continue duloxetine      # GERD: Continue prilosec for now  # Anxiety: Patient has a component of anxiety as well related to his diagnosis  States it is improving as well   # HLD: ,   Changed diet  Recheck lipid panel  RTC in 1 year    HPI: Very pleasant 45 y o  gentleman w/ no PMHx p/w acute onset retrosternal pressure to [facility] on 7/27/17  Zofran and viscous lidocaine given for nausea/emesis and possible reflux, respectively without resolution  Trop trend 0 00->0 25->11 4   S/p cardiac cath with no coronary disease  Symptoms likely from myocarditis +/- myopericarditis vs vasospasm  ECG ok  Seen by Dr Juanita Horne in f/u  The patient was started on prilosec for GERD with improvement in throat tightness and switched from lisinopril to losartan  He states that he continues to feel a little bit of pressure in the chest when he leans backwards  He states his breathing was worse since discharge, so he was switched to losartan 25 mg PO daily  He is taking prilosec and thinks this may have helped a bit  He thinks it is getting better and better  He had his cMRI on 8/14/2017 w/ results pending  Today he returns for f/u, 9/21/2017, he states he continues to feel a little bit of pressure in the chest when he leans backwards  Last week he felt a little better, and then he worked hard at work, and it exacerbated it over the weekend  He sleeps sitting up due to the pain  Today he returns for f/u, 10/17/2017, he states he continues to feel a pressure in the chest when he leans backwards  He states that the severity is decreasing  He continues to sleep sitting up  Today he returns for f/u, 11/27/2017, he states the radiating pain is gone  He gets sharp pains with activity; he feels that it is in his ribs  When he lays down he the pressure in the chest is still there, but feels like it is improving  He fell asleep on his stomach  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 without symptoms  Energy improved  Today he returns for f/u, 1/23/2018, he states the radiating pain is gone  He gets sharp pains with activity but much less frequently now; he feels that it is in his ribs but markedly improved  When he lays down he still has pressure in the chest but significantly improved  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 without symptoms, but with strenuous activity he feels it again  Energy improved  Today he returns for f/u, 5/16/18  He did not go to [facility] clinic as it was too far   He states that he still gets the R sided discomfort  He can sleep through the night fine, and occasionally has pain under the ribs  However, he sleeps on his stomach  Now when he lays flat on his back, the chest pains completely resolved  Now at work he is fine during work, but at night sometimes he feels a twinge  This is very rare  Today he returns for f/u, 6/25/18  He states he feels well  He played soccer with his kids for about 20-25 minutes  He developed some mild chest discomfort at that time but it resolved quickly with rest      Today, 8/24/18, he returns for f/u  He states he feels well  Has rare heaviness in the chest when laying down  However, this is unlike prior  Today, 2/8/19, he returns for f/u  He states he had a couple days where he had some sharp CP, but then it self resolved  Non-positional  However, other than those three days, he feels back to normal     Today 9/17/19, he returns for f/u  He feels well  No limitations  Past Medical History:   Diagnosis Date    Leukopenia 07/08/2016    LAST ASSESSED    Pericarditis     Scrotal mass      Review of Systems - 12 point ROS was done and is negative, except as noted above       No Known Allergies      Current Outpatient Medications:     DULoxetine (CYMBALTA) 30 mg delayed release capsule, TAKE 1 CAPSULE BY MOUTH EVERY DAY, Disp: 90 capsule, Rfl: 1    fluticasone (FLONASE) 50 mcg/act nasal spray, 1 spray into each nostril daily (Patient not taking: Reported on 2/8/2019 ), Disp: 16 g, Rfl: 0    losartan (COZAAR) 25 mg tablet, 1/2 tablet daily, Disp: 90 tablet, Rfl: 3    metoprolol succinate (TOPROL-XL) 25 mg 24 hr tablet, Take 1 tablet (25 mg total) by mouth daily, Disp: 30 tablet, Rfl: 6    predniSONE 10 mg tablet, 5 x 1 day, 4 x1 day, 3 x 1 day,2 x1 day,  1 x 1 day, Disp: 15 tablet, Rfl: 0    Social History     Socioeconomic History    Marital status: /Civil Union     Spouse name: Not on file    Number of children: Not on file    Years of education: Not on file    Highest education level: Not on file   Occupational History    Not on file   Social Needs    Financial resource strain: Not on file    Food insecurity:     Worry: Not on file     Inability: Not on file    Transportation needs:     Medical: Not on file     Non-medical: Not on file   Tobacco Use    Smoking status: Never Smoker    Smokeless tobacco: Never Used   Substance and Sexual Activity    Alcohol use: Yes     Comment: weekends    Drug use: No    Sexual activity: Not on file   Lifestyle    Physical activity:     Days per week: Not on file     Minutes per session: Not on file    Stress: Not on file   Relationships    Social connections:     Talks on phone: Not on file     Gets together: Not on file     Attends Congregation service: Not on file     Active member of club or organization: Not on file     Attends meetings of clubs or organizations: Not on file     Relationship status: Not on file    Intimate partner violence:     Fear of current or ex partner: Not on file     Emotionally abused: Not on file     Physically abused: Not on file     Forced sexual activity: Not on file   Other Topics Concern    Not on file   Social History Narrative    PER ALLSCRIPTS: ALCOHOLISM IN RECOVERY       Family History   Problem Relation Age of Onset    Diabetes Father     Cancer Sister     Cancer Brother     Stroke Brother     Lung cancer Brother        Physical Exam:    Vitals: There were no vitals taken for this visit  , There is no height or weight on file to calculate BMI ,   Wt Readings from Last 3 Encounters:   04/15/19 100 kg (220 lb 8 oz)   02/08/19 99 3 kg (219 lb)   01/27/19 99 8 kg (220 lb)     There were no vitals filed for this visit      GEN: [employer] appears well, alert and oriented x 3, pleasant and cooperative   HEENT: pupils equal, round, and reactive to light; extraocular muscles intact  NECK: supple, no carotid bruits   HEART: regular rhythm, normal S1 and S2, no murmurs, clicks, gallops or rubs, JVP is    LUNGS: clear to auscultation bilaterally; no wheezes, rales, or rhonchi   ABDOMEN: normal bowel sounds, soft, no tenderness, no distention  EXTREMITIES: peripheral pulses normal; no clubbing, cyanosis, or edema  NEURO: no focal findings   SKIN: normal without suspicious lesions on exposed skin    Labs & Results:  Lab Results   Component Value Date    WBC 3 95 (L) 10/21/2018    HGB 14 3 10/21/2018    HCT 44 1 10/21/2018    MCV 89 10/21/2018     10/21/2018       Chemistry        Component Value Date/Time    K 4 4 10/21/2018 0918     10/21/2018 0918    CO2 30 10/21/2018 0918    BUN 14 10/21/2018 0918    CREATININE 0 93 10/21/2018 0918        Component Value Date/Time    CALCIUM 8 8 10/21/2018 0918    ALKPHOS 86 10/21/2018 0918    AST 20 10/21/2018 0918    ALT 49 10/21/2018 0918        EKG personally reviewed by Rayna Gonsalez MD      Counseling / Coordination of Care  Total floor / unit time spent today 40 minutes  Greater than 50% of total time was spent with the patient and / or family counseling and / or coordination of care  A description of the counseling / coordination of care: 20 min  Thank you for the opportunity to participate in the care of this patient      Rayna Gonsalez MD, PhD   Loraine Weber

## 2019-12-06 DIAGNOSIS — F41.1 GENERALIZED ANXIETY DISORDER: ICD-10-CM

## 2019-12-06 RX ORDER — DULOXETIN HYDROCHLORIDE 30 MG/1
CAPSULE, DELAYED RELEASE ORAL
Qty: 90 CAPSULE | Refills: 0 | Status: SHIPPED | OUTPATIENT
Start: 2019-12-06 | End: 2020-03-29

## 2019-12-27 ENCOUNTER — OFFICE VISIT (OUTPATIENT)
Dept: URGENT CARE | Facility: MEDICAL CENTER | Age: 44
End: 2019-12-27
Payer: COMMERCIAL

## 2019-12-27 VITALS
SYSTOLIC BLOOD PRESSURE: 116 MMHG | DIASTOLIC BLOOD PRESSURE: 83 MMHG | RESPIRATION RATE: 18 BRPM | HEART RATE: 116 BPM | BODY MASS INDEX: 27.29 KG/M2 | WEIGHT: 212.52 LBS | OXYGEN SATURATION: 96 % | TEMPERATURE: 100.4 F

## 2019-12-27 DIAGNOSIS — R68.89 FLU-LIKE SYMPTOMS: Primary | ICD-10-CM

## 2019-12-27 PROCEDURE — 99213 OFFICE O/P EST LOW 20 MIN: CPT | Performed by: PHYSICIAN ASSISTANT

## 2019-12-27 RX ORDER — OSELTAMIVIR PHOSPHATE 75 MG/1
75 CAPSULE ORAL 2 TIMES DAILY
Qty: 10 CAPSULE | Refills: 0 | Status: SHIPPED | OUTPATIENT
Start: 2019-12-27 | End: 2020-01-01

## 2019-12-27 NOTE — PROGRESS NOTES
[facility]        NAME: Rex Rater is a 45 y o  male  : 1975    MRN: [id number]  DATE: 2019  TIME: 11:21 AM    Assessment and Plan   Flu-like symptoms [R68 89]  1  Flu-like symptoms  oseltamivir (TAMIFLU) 75 mg capsule     Assessment/Plan:  1  Flu-like symptoms  - Advised pt that his symptoms are presenting like the flu  Offered pt the nasal flu swab but informed him that he could get a bill from $150-$300  Pt declined flu swab  Offered tamiflu and pt accepted  Take tamiflu as prescribed  1 capsule, every 12 hours (twice a day), for 5 days  Take with food  It can cause an upset stomach  Drink plenty of fluids and get plenty of rest  Can continue with the Mucinex cold and flu  Can take Advil (ibuprofen) with the mucinex  Also take the ibuprofen with food because it can upset your stomach  Follow up with PCP in 3-5 days if your symptoms do not resolve  Go to the ER if your symptoms become severe  Patient Instructions       Follow up with PCP in 3-5 days  Proceed to  ER if symptoms worsen  Chief Complaint     Chief Complaint   Patient presents with    Cough     Patient started with a cough three days ago, Patient has bodyaches, back pain and chills         History of Present Illness       Pt is a 45 y o male presenting to urgent care with cough, body aches, and fever x 2 days  Pt states his symptoms came on suddenly on [place] night  He states his symptoms started with a cough and then progressed to fever, chills and body aches  He states he tried to go to work yesterday and had to leave early because he was "coughing and shivering"  He states his cough is sometimes productive with clear mucus  He states he had one episode this morning where his mucus was slightly red  He states he feels "very fatigued"  Pt states his body aches are mostly in his back, shoulders, and knees  Associated symptoms include sore throat and dizziness   Pt denies sinus pressure, headaches, urinary sxs, HA, blurred vision, double vision, N/V/D, abdominal pain, ear pain/fullness, CP, SOB  Pt has tried Mucinex cold and sinus with tylenol and had mild relief  Pt is not UTD with flu shot  Review of Systems   Review of Systems   Constitutional: Positive for chills, fatigue and fever  Negative for activity change, appetite change and diaphoresis  HENT: Positive for sore throat  Negative for congestion, ear discharge, ear pain, hearing loss, postnasal drip, rhinorrhea, sinus pressure, sinus pain and trouble swallowing  Eyes: Negative for pain, discharge, redness and itching  Respiratory: Positive for cough  Negative for chest tightness, shortness of breath, wheezing and stridor  Cardiovascular: Negative for chest pain, palpitations and leg swelling  Gastrointestinal: Negative for abdominal distention, abdominal pain, diarrhea, nausea and vomiting  Musculoskeletal: Negative for arthralgias, myalgias, neck pain and neck stiffness  Skin: Negative for color change, pallor and rash  Neurological: Positive for dizziness  Negative for syncope, weakness, light-headedness, numbness and headaches           Current Medications       Current Outpatient Medications:     DULoxetine (CYMBALTA) 30 mg delayed release capsule, TAKE 1 CAPSULE BY MOUTH EVERY DAY, Disp: 90 capsule, Rfl: 0    losartan (COZAAR) 25 mg tablet, 1/2 tablet daily, Disp: 90 tablet, Rfl: 3    metoprolol succinate (TOPROL-XL) 25 mg 24 hr tablet, Take 1 tablet (25 mg total) by mouth daily, Disp: 90 tablet, Rfl: 3    fluticasone (FLONASE) 50 mcg/act nasal spray, 1 spray into each nostril daily (Patient not taking: Reported on 12/27/2019), Disp: 16 g, Rfl: 0    oseltamivir (TAMIFLU) 75 mg capsule, Take 1 capsule (75 mg total) by mouth 2 (two) times a day for 5 days, Disp: 10 capsule, Rfl: 0    Current Allergies     Allergies as of 12/27/2019    (No Known Allergies)            The following portions of the patient's history were reviewed and updated as appropriate: allergies, current medications, past family history, past medical history, past social history, past surgical history and problem list      Past Medical History:   Diagnosis Date    Depression     Hypertension     Leukopenia 07/08/2016    LAST ASSESSED    Pericarditis     Scrotal mass        Past Surgical History:   Procedure Laterality Date    KNEE SURGERY Bilateral     VASECTOMY         Family History   Problem Relation Age of Onset    Diabetes Father     Cancer Sister     Cancer Brother     Stroke Brother     Lung cancer Brother          Medications have been verified  Objective   /83   Pulse (!) 116   Temp 100 4 °F (38 °C)   Resp 18   Wt 96 4 kg (212 lb 8 4 oz)   SpO2 96%   BMI 27 29 kg/m²        Physical Exam     Physical Exam   Constitutional: He is oriented to person, place, and time  He appears well-developed and well-nourished  Non-toxic appearance  He appears ill  No distress  HENT:   Head: Normocephalic and atraumatic  Right Ear: Hearing, tympanic membrane, external ear and ear canal normal    Left Ear: Hearing, tympanic membrane, external ear and ear canal normal    Nose: Mucosal edema and rhinorrhea present  Right sinus exhibits no maxillary sinus tenderness and no frontal sinus tenderness  Left sinus exhibits no maxillary sinus tenderness and no frontal sinus tenderness  Mouth/Throat: Posterior oropharyngeal erythema present  No oropharyngeal exudate or posterior oropharyngeal edema  Tonsils are 2+ on the right  Tonsils are 2+ on the left  No tonsillar exudate  Eyes: Conjunctivae are normal  Right eye exhibits no discharge  Left eye exhibits no discharge  Neck: Normal range of motion  Neck supple  Cardiovascular: Regular rhythm, normal heart sounds and intact distal pulses  No murmur heard  Tachycardic on exam   Pulmonary/Chest: Effort normal and breath sounds normal  No stridor  No respiratory distress  He has no wheezes   He has no rales  He exhibits no tenderness  Abdominal: Soft  Bowel sounds are normal  He exhibits no distension  There is no tenderness  There is no guarding  Musculoskeletal: Normal range of motion  He exhibits no edema, tenderness or deformity  Lymphadenopathy:     He has no cervical adenopathy  Neurological: He is alert and oriented to person, place, and time  Skin: Skin is warm and dry  No rash noted  He is not diaphoretic  No erythema  No pallor  Nursing note and vitals reviewed

## 2019-12-27 NOTE — LETTER
December 27, 2019     Patient: Stan Limon   YOB: 1975   Date of Visit: 12/27/2019       To Whom it May Concern:    Stan Limon was seen in my clinic on 12/27/2019  He may return to work on 12/30/2019  If you have any questions or concerns, please don't hesitate to call           Sincerely,          Burak Ardon PA-C        CC: No Recipients

## 2019-12-27 NOTE — PATIENT INSTRUCTIONS
Take tamiflu as prescribed  1 capsule, every 12 hours (twice a day), for 5 days  Take with food  It can cause an upset stomach  Drink plenty of fluids and get plenty of rest  Can continue with the Mucinex cold and flu  Can take Advil (ibuprofen) with the mucinex  Also take the ibuprofen with food because it can upset your stomach  Follow up with PCP in 3-5 days if your symptoms do not resolve  Go to the ER if your symptoms become severe  Influenza   WHAT YOU NEED TO KNOW:   Influenza (the flu) is an infection caused by the influenza virus  The flu is easily spread when an infected person coughs, sneezes, or has close contact with others  You may be able to spread the flu to others for 1 week or longer after signs or symptoms appear  DISCHARGE INSTRUCTIONS:   Call 911 for any of the following:   · You have trouble breathing, and your lips look purple or blue  · You have a seizure  Return to the emergency department if:   · You are dizzy, or you are urinating less or not at all  · You have a headache with a stiff neck, and you feel tired or confused  · You have new pain or pressure in your chest     · Your symptoms, such as shortness of breath, vomiting, or diarrhea, get worse  · Your symptoms, such as fever and coughing, seem to get better, but then get worse  Contact your healthcare provider if:   · You have new muscle pain or weakness  · You have questions or concerns about your condition or care  Medicines: You may need any of the following:  · Acetaminophen  decreases pain and fever  It is available without a doctor's order  Ask how much to take and how often to take it  Follow directions  Acetaminophen can cause liver damage if not taken correctly  · NSAIDs , such as ibuprofen, help decrease swelling, pain, and fever  This medicine is available with or without a doctor's order  NSAIDs can cause stomach bleeding or kidney problems in certain people   If you take blood thinner medicine, always ask your healthcare provider if NSAIDs are safe for you  Always read the medicine label and follow directions  · Antivirals  help fight a viral infection  · Take your medicine as directed  Contact your healthcare provider if you think your medicine is not helping or if you have side effects  Tell him or her if you are allergic to any medicine  Keep a list of the medicines, vitamins, and herbs you take  Include the amounts, and when and why you take them  Bring the list or the pill bottles to follow-up visits  Carry your medicine list with you in case of an emergency  Rest  as much as you can to help you recover  Drink liquids as directed  to help prevent dehydration  Ask how much liquid to drink each day and which liquids are best for you  Prevent the spread of influenza:   · Wash your hands often  Use soap and water  Wash your hands after you use the bathroom, change a child's diapers, or sneeze  Wash your hands before you prepare or eat food  Use gel hand cleanser when soap and water are not available  Do not touch your eyes, nose, or mouth unless you have washed your hands first            · Cover your mouth when you sneeze or cough  Cough into a tissue or the bend of your arm  · Clean shared items with a germ-killing   Clean table surfaces, doorknobs, and light switches  Do not share towels, silverware, and dishes with people who are sick  Wash bed sheets, towels, silverware, and dishes with soap and water  · Wear a mask  over your mouth and nose if you are sick or are near anyone who is sick  · Stay away from others  if you are sick  · Influenza vaccine  helps prevent influenza (flu)  Everyone older than 6 months should get a yearly influenza vaccine  Get the vaccine as soon as it is available, usually in September or October each year    Follow up with your healthcare provider as directed:  Write down your questions so you remember to ask them during your visits  © 2017 [facility] Information is for End User's use only and may not be sold, redistributed or otherwise used for commercial purposes  All illustrations and images included in CareNotes® are the copyrighted property of A D A M [employer] Inc  or Ryan Esquivel  The above information is an  only  It is not intended as medical advice for individual conditions or treatments  Talk to your doctor, nurse or pharmacist before following any medical regimen to see if it is safe and effective for you

## 2020-01-08 ENCOUNTER — OFFICE VISIT (OUTPATIENT)
Dept: CARDIOLOGY CLINIC | Facility: CLINIC | Age: 45
End: 2020-01-08
Payer: COMMERCIAL

## 2020-01-08 VITALS
WEIGHT: 220.3 LBS | BODY MASS INDEX: 28.27 KG/M2 | SYSTOLIC BLOOD PRESSURE: 100 MMHG | HEIGHT: 74 IN | OXYGEN SATURATION: 97 % | DIASTOLIC BLOOD PRESSURE: 56 MMHG | HEART RATE: 79 BPM

## 2020-01-08 DIAGNOSIS — I31.9 PERICARDITIS, UNSPECIFIED CHRONICITY, UNSPECIFIED TYPE: Primary | ICD-10-CM

## 2020-01-08 PROCEDURE — 99214 OFFICE O/P EST MOD 30 MIN: CPT | Performed by: INTERNAL MEDICINE

## 2020-01-08 RX ORDER — COLCHICINE 0.6 MG/1
0.3 TABLET ORAL 2 TIMES DAILY
Qty: 14 TABLET | Refills: 2 | Status: SHIPPED | OUTPATIENT
Start: 2020-01-08 | End: 2020-03-06 | Stop reason: SDUPTHER

## 2020-01-08 RX ORDER — COLCHICINE 0.6 MG/1
0.3 TABLET ORAL 2 TIMES DAILY
Qty: 14 TABLET | Refills: 0 | Status: SHIPPED | OUTPATIENT
Start: 2020-01-08 | End: 2020-01-08 | Stop reason: SDUPTHER

## 2020-01-08 NOTE — PROGRESS NOTES
Advanced Heart Failure/Pulmonary Hypertension Outpatient Progress Note - Silvio Dominguez 45 y o  male MRN: [id number]    @ Encounter: [id number]  Assessment/Plan:    Patient Active Problem List    Diagnosis Date Noted    History of myocarditis 11/12/2018    Hyperglycemia 10/01/2018    Dyslipidemia 10/01/2018    Acute meniscal tear, medial 08/13/2018    Generalized anxiety disorder 09/01/2017     # Myopericarditis with recovered LVEF by TTE w/ mildly reduced RVSF  Clean coronaries on cardiac cath  TTE w/o focal WMA  Euvolemic on exam  Symptomatically appears to be improving  cMRI shows LVEF --45-50% w/ top normal RV size, but mildly reduced function  No e/o scar, inflammation, or infiltrative disease  Pericarditis symptoms resolved x ~1 month now  LVEF has normalized, but RVSF still reduced as noted below  Now RV function normalized  Now asymptomatic  NYHA I symptoms  Due to reduction in function we will do the following :   Diag:  --TTE 2/1/18 shows LVEF>55%, grade 1 diastology, mild RV dilation, grade 1 diastology, no sig valvular disease  --CBC, Chem 10 and A1c 6 1 all ok  Lipid panel shows elevated LDL ~138 w/   --TTE 7/2018: LVEF ~50%, normal RV  --TTE 3/15/19: LVEF ~50%, borderline RV (unchanged from prior)    Therapeutic:   --Continue metoprolol succinate 12 5 mg PO BID   --Continue losartan 25 mg PO daily   --Restart colchicine x 2 weeks  Hold off on NSAIDs   --Continue full activity  --Continue duloxetine      # GERD: Continue prilosec for now  # Anxiety: Patient has a component of anxiety as well related to his diagnosis  States it is improving as well   # HLD: ,   Changed diet  Recheck lipid panel  RTC in 6 months    HPI: Very pleasant 45 y o  gentleman w/ no PMHx p/w acute onset retrosternal pressure to [facility] on 7/27/17  Zofran and viscous lidocaine given for nausea/emesis and possible reflux, respectively without resolution  Trop trend 0 00->0 25->11 4   S/p cardiac cath with no coronary disease  Symptoms likely from myocarditis +/- myopericarditis vs vasospasm  ECG ok  Seen by Dr Amber Ley in f/u  The patient was started on prilosec for GERD with improvement in throat tightness and switched from lisinopril to losartan  He states that he continues to feel a little bit of pressure in the chest when he leans backwards  He states his breathing was worse since discharge, so he was switched to losartan 25 mg PO daily  He is taking prilosec and thinks this may have helped a bit  He thinks it is getting better and better  He had his cMRI on 8/14/2017 w/ results pending  Today he returns for f/u, 9/21/2017, he states he continues to feel a little bit of pressure in the chest when he leans backwards  Last week he felt a little better, and then he worked hard at work, and it exacerbated it over the weekend  He sleeps sitting up due to the pain  Today he returns for f/u, 10/17/2017, he states he continues to feel a pressure in the chest when he leans backwards  He states that the severity is decreasing  He continues to sleep sitting up  Today he returns for f/u, 11/27/2017, he states the radiating pain is gone  He gets sharp pains with activity; he feels that it is in his ribs  When he lays down he the pressure in the chest is still there, but feels like it is improving  He fell asleep on his stomach  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 without symptoms  Energy improved  Today he returns for f/u, 1/23/2018, he states the radiating pain is gone  He gets sharp pains with activity but much less frequently now; he feels that it is in his ribs but markedly improved  When he lays down he still has pressure in the chest but significantly improved  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 without symptoms, but with strenuous activity he feels it again  Energy improved  Today he returns for f/u, 5/16/18  He did not go to [id number] clinic as it was too far   He states that he still gets the R sided discomfort  He can sleep through the night fine, and occasionally has pain under the ribs  However, he sleeps on his stomach  Now when he lays flat on his back, the chest pains completely resolved  Now at work he is fine during work, but at night sometimes he feels a twinge  This is very rare  Today he returns for f/u, 6/25/18  He states he feels well  He played soccer with his kids for about 20-25 minutes  He developed some mild chest discomfort at that time but it resolved quickly with rest      Today, 8/24/18, he returns for f/u  He states he feels well  Has rare heaviness in the chest when laying down  However, this is unlike prior  Today, 2/8/19, he returns for f/u  He states he had a couple days where he had some sharp CP, but then it self resolved  Non-positional  However, other than those three days, he feels back to normal     Today 9/17/19, he returns for f/u  He feels well  No limitations  1/8/2020: States he had a URI/flu a couple of weeks ago  He has had a persistent cough that is now improving  However, he notes that he is having chest pain again like before, but it is now more on the center/left  Past Medical History:   Diagnosis Date    Depression     Hypertension     Leukopenia 07/08/2016    LAST ASSESSED    Pericarditis     Scrotal mass      Review of Systems - 12 point ROS was done and is negative, except as noted above       No Known Allergies      Current Outpatient Medications:     DULoxetine (CYMBALTA) 30 mg delayed release capsule, TAKE 1 CAPSULE BY MOUTH EVERY DAY, Disp: 90 capsule, Rfl: 0    fluticasone (FLONASE) 50 mcg/act nasal spray, 1 spray into each nostril daily (Patient not taking: Reported on 12/27/2019), Disp: 16 g, Rfl: 0    losartan (COZAAR) 25 mg tablet, 1/2 tablet daily, Disp: 90 tablet, Rfl: 3    metoprolol succinate (TOPROL-XL) 25 mg 24 hr tablet, Take 1 tablet (25 mg total) by mouth daily, Disp: 90 tablet, Rfl: 3    Social History     Socioeconomic History    Marital status: /Civil Union     Spouse name: Not on file    Number of children: Not on file    Years of education: Not on file    Highest education level: Not on file   Occupational History    Not on file   Social Needs    Financial resource strain: Not on file    Food insecurity:     Worry: Not on file     Inability: Not on file    Transportation needs:     Medical: Not on file     Non-medical: Not on file   Tobacco Use    Smoking status: Never Smoker    Smokeless tobacco: Never Used   Substance and Sexual Activity    Alcohol use: Yes     Comment: weekends    Drug use: No    Sexual activity: Not on file   Lifestyle    Physical activity:     Days per week: Not on file     Minutes per session: Not on file    Stress: Not on file   Relationships    Social connections:     Talks on phone: Not on file     Gets together: Not on file     Attends Voodoo service: Not on file     Active member of club or organization: Not on file     Attends meetings of clubs or organizations: Not on file     Relationship status: Not on file    Intimate partner violence:     Fear of current or ex partner: Not on file     Emotionally abused: Not on file     Physically abused: Not on file     Forced sexual activity: Not on file   Other Topics Concern    Not on file   Social History Narrative    PER ALLSCRIPTS: ALCOHOLISM IN RECOVERY       Family History   Problem Relation Age of Onset    Diabetes Father     Cancer Sister     Cancer Brother     Stroke Brother     Lung cancer Brother        Physical Exam:    Vitals: There were no vitals taken for this visit  , There is no height or weight on file to calculate BMI ,   Wt Readings from Last 3 Encounters:   12/27/19 96 4 kg (212 lb 8 4 oz)   09/17/19 103 kg (226 lb)   04/15/19 100 kg (220 lb 8 oz)     There were no vitals filed for this visit      GEN: [employer] appears well, alert and oriented x 3, pleasant and cooperative   HEENT: pupils equal, round, and reactive to light; extraocular muscles intact  NECK: supple, no carotid bruits   HEART: regular rhythm, normal S1 and S2, no murmurs, clicks, gallops or rubs, JVP is    LUNGS: clear to auscultation bilaterally; no wheezes, rales, or rhonchi   ABDOMEN: normal bowel sounds, soft, no tenderness, no distention  EXTREMITIES: peripheral pulses normal; no clubbing, cyanosis, or edema  NEURO: no focal findings   SKIN: normal without suspicious lesions on exposed skin    Labs & Results:  Lab Results   Component Value Date    WBC 3 95 (L) 10/21/2018    HGB 14 3 10/21/2018    HCT 44 1 10/21/2018    MCV 89 10/21/2018     10/21/2018       Chemistry        Component Value Date/Time    K 4 4 10/21/2018 0918     10/21/2018 0918    CO2 30 10/21/2018 0918    BUN 14 10/21/2018 0918    CREATININE 0 93 10/21/2018 0918        Component Value Date/Time    CALCIUM 8 8 10/21/2018 0918    ALKPHOS 86 10/21/2018 0918    AST 20 10/21/2018 0918    ALT 49 10/21/2018 0918        EKG personally reviewed by Susanne Simmons MD      Counseling / Coordination of Care  Total floor / unit time spent today 40 minutes  Greater than 50% of total time was spent with the patient and / or family counseling and / or coordination of care  A description of the counseling / coordination of care: 20 min  Thank you for the opportunity to participate in the care of this patient      Susanne Simmons MD, PhD   Danielle Calix

## 2020-02-06 DIAGNOSIS — I10 HYPERTENSION, UNSPECIFIED TYPE: ICD-10-CM

## 2020-02-06 RX ORDER — LOSARTAN POTASSIUM 25 MG/1
25 TABLET ORAL DAILY
Qty: 90 TABLET | Refills: 3 | Status: SHIPPED | OUTPATIENT
Start: 2020-02-06 | End: 2020-09-21 | Stop reason: ALTCHOICE

## 2020-03-05 DIAGNOSIS — I31.9 PERICARDITIS, UNSPECIFIED CHRONICITY, UNSPECIFIED TYPE: ICD-10-CM

## 2020-03-06 DIAGNOSIS — I31.9 PERICARDITIS, UNSPECIFIED CHRONICITY, UNSPECIFIED TYPE: ICD-10-CM

## 2020-03-06 RX ORDER — COLCHICINE 0.6 MG/1
0.3 TABLET ORAL 2 TIMES DAILY
Qty: 60 TABLET | Refills: 2 | Status: SHIPPED | OUTPATIENT
Start: 2020-03-06 | End: 2020-09-21 | Stop reason: SDUPTHER

## 2020-03-06 RX ORDER — COLCHICINE 0.6 MG/1
TABLET ORAL
Qty: 14 TABLET | Refills: 2 | Status: SHIPPED | OUTPATIENT
Start: 2020-03-06 | End: 2020-09-21

## 2020-03-29 DIAGNOSIS — F41.1 GENERALIZED ANXIETY DISORDER: ICD-10-CM

## 2020-03-29 RX ORDER — DULOXETIN HYDROCHLORIDE 30 MG/1
CAPSULE, DELAYED RELEASE ORAL
Qty: 90 CAPSULE | Refills: 0 | Status: SHIPPED | OUTPATIENT
Start: 2020-03-29 | End: 2020-06-21

## 2020-06-20 DIAGNOSIS — F41.1 GENERALIZED ANXIETY DISORDER: ICD-10-CM

## 2020-06-21 RX ORDER — DULOXETIN HYDROCHLORIDE 30 MG/1
CAPSULE, DELAYED RELEASE ORAL
Qty: 30 CAPSULE | Refills: 0 | Status: SHIPPED | OUTPATIENT
Start: 2020-06-21 | End: 2020-08-10 | Stop reason: SDUPTHER

## 2020-07-15 DIAGNOSIS — F41.1 GENERALIZED ANXIETY DISORDER: ICD-10-CM

## 2020-07-15 RX ORDER — DULOXETIN HYDROCHLORIDE 30 MG/1
CAPSULE, DELAYED RELEASE ORAL
Qty: 30 CAPSULE | Refills: 0 | OUTPATIENT
Start: 2020-07-15

## 2020-08-04 DIAGNOSIS — F41.1 GENERALIZED ANXIETY DISORDER: ICD-10-CM

## 2020-08-04 RX ORDER — DULOXETIN HYDROCHLORIDE 30 MG/1
CAPSULE, DELAYED RELEASE ORAL
Qty: 30 CAPSULE | Refills: 0 | OUTPATIENT
Start: 2020-08-04

## 2020-08-04 NOTE — TELEPHONE ENCOUNTER
Patient has been told twice that he was overdue for checkups  I refused to fill his prescription  See if he will be willing to schedule an appointment now  I will not fill the prescription until he comes in

## 2020-08-10 ENCOUNTER — OFFICE VISIT (OUTPATIENT)
Dept: FAMILY MEDICINE CLINIC | Facility: CLINIC | Age: 45
End: 2020-08-10
Payer: COMMERCIAL

## 2020-08-10 VITALS
SYSTOLIC BLOOD PRESSURE: 120 MMHG | TEMPERATURE: 98.3 F | HEIGHT: 74 IN | BODY MASS INDEX: 28.36 KG/M2 | DIASTOLIC BLOOD PRESSURE: 82 MMHG | WEIGHT: 221 LBS

## 2020-08-10 DIAGNOSIS — F41.1 GENERALIZED ANXIETY DISORDER: ICD-10-CM

## 2020-08-10 DIAGNOSIS — Z86.79 HISTORY OF MYOCARDITIS: ICD-10-CM

## 2020-08-10 DIAGNOSIS — R73.9 HYPERGLYCEMIA: ICD-10-CM

## 2020-08-10 DIAGNOSIS — E78.5 DYSLIPIDEMIA: Primary | ICD-10-CM

## 2020-08-10 PROCEDURE — 3008F BODY MASS INDEX DOCD: CPT | Performed by: FAMILY MEDICINE

## 2020-08-10 PROCEDURE — 3079F DIAST BP 80-89 MM HG: CPT | Performed by: FAMILY MEDICINE

## 2020-08-10 PROCEDURE — 99214 OFFICE O/P EST MOD 30 MIN: CPT | Performed by: FAMILY MEDICINE

## 2020-08-10 PROCEDURE — 3074F SYST BP LT 130 MM HG: CPT | Performed by: FAMILY MEDICINE

## 2020-08-10 PROCEDURE — 3725F SCREEN DEPRESSION PERFORMED: CPT | Performed by: FAMILY MEDICINE

## 2020-08-10 PROCEDURE — 1036F TOBACCO NON-USER: CPT | Performed by: FAMILY MEDICINE

## 2020-08-10 RX ORDER — DULOXETIN HYDROCHLORIDE 20 MG/1
CAPSULE, DELAYED RELEASE ORAL
Qty: 10 CAPSULE | Refills: 0 | Status: SHIPPED | OUTPATIENT
Start: 2020-08-10 | End: 2020-09-01 | Stop reason: ALTCHOICE

## 2020-08-10 NOTE — ASSESSMENT & PLAN NOTE
Patient has done well  Currently taking 1/2 of a 25 mg Cozaar and 25 mg of metoprolol daily  Will stop the losartan, stop by the office in 2 weeks for blood pressure check    Recheck in the office in 6 months

## 2020-08-10 NOTE — PROGRESS NOTES
Assessment/Plan:    Generalized anxiety disorder  Patient doing quite well  Will titrate off of Cymbalta  Will call me if there is any problems  Recheck in 6 months    History of myocarditis   Patient has done well  Currently taking 1/2 of a 25 mg Cozaar and 25 mg of metoprolol daily  Will stop the losartan, stop by the office in 2 weeks for blood pressure check  Recheck in the office in 6 months       Diagnoses and all orders for this visit:    Dyslipidemia  -     Comprehensive metabolic panel; Future  -     Lipid panel; Future  -     CBC and differential; Future    Generalized anxiety disorder  -     DULoxetine (CYMBALTA) 20 mg capsule; One capsule every other day for 2 weeks, 1 capsule every 3 days until finished  Hyperglycemia  -     Comprehensive metabolic panel; Future  -     Hemoglobin A1C; Future    History of myocarditis          Subjective:   Chief Complaint   Patient presents with    Anxiety          Patient ID: Kerry Ortiz is a 39 y o  male  He is here for follow-up on his anxiety, history of myocarditis, sugar, and a general checkup  He has been doing quite well since I last saw him in April of 2019  He finished his culture seen from Cardiology, and his moods have been much improved  He was lost to follow-up, but is prescriptions were continued  He presents today staying me that he has  Cut back on his losartan, taking 1/2 tablet daily  Currently taking 30 mg of duloxetine every other day  His moods are stable on this, he feels quite well and he would like to get office much medication as possible  He is overdue for lab work        The following portions of the patient's history were reviewed and updated as appropriate: allergies, current medications, past family history, past medical history, past social history, past surgical history and problem list     Review of Systems   Constitutional: Negative for appetite change, chills, diaphoresis, fatigue, fever and unexpected weight change  HENT: Negative for congestion, ear pain, hearing loss, nosebleeds, postnasal drip, rhinorrhea, sinus pressure, sore throat, tinnitus and trouble swallowing  Eyes: Negative for photophobia, pain, discharge, redness, itching and visual disturbance  Respiratory: Negative for cough, chest tightness, shortness of breath and wheezing  Cardiovascular: Negative for chest pain, palpitations and leg swelling  Denies orthopnea , dyspnea on exertion   Gastrointestinal: Negative for abdominal distention, abdominal pain, blood in stool, constipation, diarrhea, nausea and vomiting  Endocrine: Negative  Genitourinary: Negative for difficulty urinating, dysuria, flank pain, frequency, hematuria and urgency  Denies nocturia , erectile dysfunction   Musculoskeletal: Negative for arthralgias, back pain, gait problem, joint swelling and myalgias  Skin: Negative for pallor, rash and wound  Denies skin lesions   Allergic/Immunologic: Negative for environmental allergies, food allergies and immunocompromised state  Neurological: Negative for dizziness, tremors, seizures, syncope, speech difficulty, weakness, numbness and headaches  Hematological: Negative for adenopathy  Does not bruise/bleed easily  Psychiatric/Behavioral: Negative for behavioral problems, confusion, sleep disturbance and suicidal ideas  The patient is not nervous/anxious  Objective:      /82   Temp 98 3 °F (36 8 °C)   Ht 6' 2" (1 88 m)   Wt 100 kg (221 lb)   BMI 28 37 kg/m²          Physical Exam  Constitutional:       General: He is not in acute distress  Appearance: He is well-developed  HENT:      Head: Normocephalic and atraumatic  Nose: Nose normal    Eyes:      Conjunctiva/sclera: Conjunctivae normal       Pupils: Pupils are equal, round, and reactive to light  Neck:      Musculoskeletal: Normal range of motion and neck supple  Thyroid: No thyromegaly  Vascular: No JVD  Trachea: No tracheal deviation  Cardiovascular:      Rate and Rhythm: Normal rate and regular rhythm  Heart sounds: No murmur  Pulmonary:      Effort: Pulmonary effort is normal       Breath sounds: Normal breath sounds  No wheezing or rales  Abdominal:      General: Bowel sounds are normal       Palpations: Abdomen is soft  There is no mass  Tenderness: There is no abdominal tenderness  There is no guarding or rebound  Musculoskeletal:         General: No tenderness or deformity  Lymphadenopathy:      Cervical: No cervical adenopathy  Skin:     General: Skin is warm and dry  Findings: No lesion or rash  Nails: There is no clubbing  Neurological:      Mental Status: He is alert and oriented to person, place, and time  Cranial Nerves: No cranial nerve deficit  Motor: No abnormal muscle tone  Coordination: Coordination normal       Deep Tendon Reflexes: Reflexes are normal and symmetric  Reflexes normal    Psychiatric:         Judgment: Judgment normal        BMI Counseling: Body mass index is 28 37 kg/m²  The BMI is above normal  Nutrition recommendations include moderation in carbohydrate intake, increasing intake of lean protein and reducing intake of saturated and trans fat  Exercise recommendations include exercising 3-5 times per week

## 2020-08-10 NOTE — ASSESSMENT & PLAN NOTE
Patient doing quite well  Will titrate off of Cymbalta  Will call me if there is any problems    Recheck in 6 months

## 2020-08-26 ENCOUNTER — CLINICAL SUPPORT (OUTPATIENT)
Dept: FAMILY MEDICINE CLINIC | Facility: CLINIC | Age: 45
End: 2020-08-26

## 2020-08-26 VITALS — DIASTOLIC BLOOD PRESSURE: 90 MMHG | SYSTOLIC BLOOD PRESSURE: 128 MMHG

## 2020-08-26 DIAGNOSIS — I10 ESSENTIAL HYPERTENSION: Primary | ICD-10-CM

## 2020-08-26 NOTE — PROGRESS NOTES
Assessment/Plan:      There are no diagnoses linked to this encounter  Subjective:  Chief Complaint   Patient presents with    Hypertension     BP check        Patient ID: Sharon Garcia is a 39 y o  male      HPI    Review of Systems      Objective:     Physical Exam

## 2020-09-01 DIAGNOSIS — F41.1 GENERALIZED ANXIETY DISORDER: ICD-10-CM

## 2020-09-01 RX ORDER — DULOXETIN HYDROCHLORIDE 20 MG/1
CAPSULE, DELAYED RELEASE ORAL
Qty: 10 CAPSULE | Refills: 0 | OUTPATIENT
Start: 2020-09-01

## 2020-09-16 ENCOUNTER — TELEPHONE (OUTPATIENT)
Dept: FAMILY MEDICINE CLINIC | Facility: CLINIC | Age: 45
End: 2020-09-16

## 2020-09-16 DIAGNOSIS — F41.1 GENERALIZED ANXIETY DISORDER: Primary | ICD-10-CM

## 2020-09-16 RX ORDER — BUSPIRONE HYDROCHLORIDE 10 MG/1
TABLET ORAL
Qty: 60 TABLET | Refills: 2 | Status: SHIPPED | OUTPATIENT
Start: 2020-09-16 | End: 2020-12-17

## 2020-09-16 NOTE — TELEPHONE ENCOUNTER
I sent in some BuSpar for him, he can take 1/2 or 1 tablet up to twice a day as needed  If he knows he is going to be doing something that makes him anxious, he should take it about an hour before he has to do it  I should see him in a month to see how he is doing with it

## 2020-09-20 NOTE — PROGRESS NOTES
Advanced Heart Failure / Pulmonary Hypertension Outpatient Progress Note    Mono Dominguez 39 y o  male   MRN: [id number]  Encounter: [id number]    Assessment / Plan:  Patient Active Problem List    Diagnosis Date Noted    History of myocarditis 11/12/2018    Hyperglycemia 10/01/2018    Dyslipidemia 10/01/2018    Acute meniscal tear, medial 08/13/2018    Generalized anxiety disorder 09/01/2017       Today's Plan:  Chey Myles Presents with recurrent pericarditis symptoms, will write for colchicine 0 3 mg BID for 2 weeks   Follow-up in 3 months with Dr Nicola Beckman at [facility] office  Myopericarditis; LVEF ~50%; LVIDd 5 9 cm; NYHA I; ACC/AHA Stage B   LHC 07/27/2017: no obstructive epicardial CAD  cMRI 08/14/2017: LVEF --45-50% with top normal RV size, but mildly reduced function  No e/o scar, inflammation, or infiltrative disease  TTE 02/01/2018 shows LVEF>55%, grade 1 diastology, mild RV dilation, grade 1 diastology, no sig valvular disease  TTE 07/2018: LVEF ~50%, normal RV  TTE 03/15/2019: LVEF ~50%, borderline RV (unchanged from prior)  Requires PRN colchicine 1-2 times annually, per chart review  Neurohormonal Blockade:  --Beta Blocker: metoprolol succinate 25 mg daily  --ARNi / ACEi / ARB: No (losartan 12 5 mg daily discontinued by PCP on 08/10/2020)  --Aldosterone Antagonist: No   --SGLT2 Inhibitor: No   --Diuretic: No     Sudden Cardiac Death Risk Reduction:  --LVEF>35%  Electrical Resynchronization:  --Candidacy for BiV device: narrow QRS  Advanced Therapies: Will continue to monitor  Hyperlipidemia   Most recent lipid panel from 02/13/2019: cholesterol 226; TG 64; HDL 52; calculated     10-year ASCVD risk score: 2 2%  Not currently on medication  GERD without esophagitis  Generalized anxiety disorder: component of anxiety as well related to his diagnosis  HPI:   Aracelis Cheek is a 43-year-old man with a PMH as above who presents to the office for follow-up       From [facility]      Encounter Date:02/18/2019     Elías REGALADO [id number]  [unfilled]    1938    Dominick Zaidi MD    Elías Cline [id number]     • Chronic systolic heart failure (CMS/[facility]) [facility]) 08/28/2018   • Essential hypertension 08/28/2018   • Coronary artery disease involving native coronary artery of native heart with unstable angina pectoris (CMS/[facility]) [facility]) office visit with Dr Shelby Maldonado on 01/08/2020: "Very pleasant 45 y o  gentleman w/ no PMHx p/w acute onset retrosternal pressure to [facility] on 7/27/17  Zofran and viscous lidocaine given for nausea/emesis and possible reflux, respectively without resolution  Trop trend 0 00->0 25->11 4  S/p cardiac cath with no coronary disease  Symptoms likely from myocarditis +/- myopericarditis vs vasospasm  ECG ok  Seen by Dr Kaitlyn Veronica in f/u  The patient was started on prilosec for GERD with improvement in throat tightness and switched from lisinopril to losartan        He states that he continues to feel a little bit of pressure in the chest when he leans backwards  He states his breathing was worse since discharge, so he was switched to losartan 25 mg PO daily  He is taking prilosec and thinks this may have helped a bit  He thinks it is getting better and better  He had his cMRI on 8/14/2017 w/ results pending       Today he returns for f/u, 9/21/2017, he states he continues to feel a little bit of pressure in the chest when he leans backwards  Last week he felt a little better, and then he worked hard at work, and it exacerbated it over the weekend  He sleeps sitting up due to the pain      Today he returns for f/u, 10/17/2017, he states he continues to feel a pressure in the chest when he leans backwards  He states that the severity is decreasing  He continues to sleep sitting up      Today he returns for f/u, 11/27/2017, he states the radiating pain is gone  He gets sharp pains with activity; he feels that it is in his ribs  When he lays down he the pressure in the chest is still there, but feels like it is improving  He fell asleep on his stomach  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 without symptoms  Energy improved      Today he returns for f/u, 1/23/2018, he states the radiating pain is gone  He gets sharp pains with activity but much less frequently now; he feels that it is in his ribs but markedly improved  When he lays down he still has pressure in the chest but significantly improved  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 without symptoms, but with strenuous activity he feels it again  Energy improved       Today he returns for f/u, 5/16/18  He did not go to [facility] as it was too far  He states that he still gets the R sided discomfort  He can sleep through the night fine, and occasionally has pain under the ribs  However, he sleeps on his stomach  Now when he lays flat on his back, the chest pains completely resolved  Now at work he is fine during work, but at night sometimes he feels a twinge  This is very rare      Today he returns for f/u, 6/25/18  He states he feels well  He played soccer with his kids for about 20-25 minutes  He developed some mild chest discomfort at that time but it resolved quickly with rest       Today, 8/24/18, he returns for f/u  He states he feels well  Has rare heaviness in the chest when laying down  However, this is unlike prior       Today, 2/8/19, he returns for f/u  He states he had a couple days where he had some sharp CP, but then it self resolved  Non-positional  However, other than those three days, he feels back to normal      Today 9/17/19, he returns for f/u  He feels well  No limitations     1/8/2020: States he had a URI/flu a couple of weeks ago  He has had a persistent cough that is now improving  However, he notes that he is having chest pain again like before, but it is now more on the center/left "    09/21/2020: Patient reports pericarditis symptoms started again about a week ago  Left sided chest pain lasting 3-10 minutes, worsening with laying down/reclining  No significant worsening with leaning forward  Recently started on Buspar; no changes in chest tightness/pain with start of medication      Past Medical History:   Diagnosis Date    Depression     Hypertension     Leukopenia 07/08/2016    LAST ASSESSED    Pericarditis     Scrotal mass Review of Systems   Constitutional: Negative for activity change, fatigue, fever and unexpected weight change  HENT: Negative for congestion, postnasal drip, rhinorrhea, sneezing, sore throat and trouble swallowing  Eyes: Negative  Respiratory: Negative for cough, chest tightness and shortness of breath  Cardiovascular: Positive for chest pain  Negative for palpitations and leg swelling  Gastrointestinal: Negative for abdominal distention, abdominal pain, diarrhea, nausea and vomiting  Endocrine: Negative  Genitourinary: Negative for decreased urine volume, difficulty urinating, dysuria, frequency and urgency  Musculoskeletal: Negative  Skin: Negative  Allergic/Immunologic: Negative  Neurological: Negative for dizziness, tremors, syncope, weakness, light-headedness and headaches  Hematological: Negative  Psychiatric/Behavioral: Negative for agitation, confusion and sleep disturbance  The patient is not nervous/anxious  14-point ROS completed and negative except as stated above and/or in the HPI      No Known Allergies      Current Outpatient Medications:     busPIRone (BUSPAR) 10 mg tablet, 1/2 or 1 tablet twice a day as needed for anxiety, Disp: 60 tablet, Rfl: 2    colchicine (COLCRYS) 0 6 mg tablet, TAKE 1/2 TABLET BY MOUTH 2 (TWO) TIMES A DAY, Disp: 14 tablet, Rfl: 2    metoprolol succinate (TOPROL-XL) 25 mg 24 hr tablet, Take 1 tablet (25 mg total) by mouth daily, Disp: 90 tablet, Rfl: 3    Social History     Socioeconomic History    Marital status: /Civil Union     Spouse name: Not on file    Number of children: Not on file    Years of education: Not on file    Highest education level: Not on file   Occupational History    Not on file   Social Needs    Financial resource strain: Not on file    Food insecurity     Worry: Not on file     Inability: Not on file    Transportation needs     Medical: Not on file     Non-medical: Not on file   Tobacco Use Smoking status: Never Smoker    Smokeless tobacco: Never Used   Substance and Sexual Activity    Alcohol use: Yes     Comment: weekends    Drug use: No    Sexual activity: Not on file   Lifestyle    Physical activity     Days per week: Not on file     Minutes per session: Not on file    Stress: Not on file   Relationships    Social connections     Talks on phone: Not on file     Gets together: Not on file     Attends Taoism service: Not on file     Active member of club or organization: Not on file     Attends meetings of clubs or organizations: Not on file     Relationship status: Not on file    Intimate partner violence     Fear of current or ex partner: Not on file     Emotionally abused: Not on file     Physically abused: Not on file     Forced sexual activity: Not on file   Other Topics Concern    Not on file   Social History Narrative    PER ALLSCRIPTS: ALCOHOLISM IN RECOVERY     Family History   Problem Relation Age of Onset    Diabetes Father     Cancer Sister     Cancer Brother     Stroke Brother     Lung cancer Brother        Vitals:   Blood pressure 110/80, pulse 78, temperature (!) 96 9 °F (36 1 °C), temperature source Temporal, height 6' 2" (1 88 m), weight 102 kg (225 lb 6 4 oz), SpO2 99 %  Body mass index is 28 94 kg/m²  Wt Readings from Last 3 Encounters:   09/21/20 102 kg (225 lb 6 4 oz)   08/10/20 100 kg (221 lb)   01/08/20 99 9 kg (220 lb 4 8 oz)     Vitals:    09/21/20 0844   BP: 110/80   BP Location: Left arm   Patient Position: Sitting   Cuff Size: Extra-Large   Pulse: 78   Temp: (!) 96 9 °F (36 1 °C)   TempSrc: Temporal   SpO2: 99%   Weight: 102 kg (225 lb 6 4 oz)   Height: 6' 2" (1 88 m)       Physical Exam  Vitals signs reviewed  Constitutional:       General: He is not in acute distress  Appearance: Normal appearance  He is well-developed  Comments: Face mask present   HENT:      Head: Normocephalic        Nose: Nose normal       Mouth/Throat: Mouth: Mucous membranes are moist    Eyes:      General: No scleral icterus  Conjunctiva/sclera: Conjunctivae normal    Neck:      Musculoskeletal: Neck supple  Vascular: No JVD  Trachea: No tracheal deviation  Cardiovascular:      Rate and Rhythm: Normal rate and regular rhythm  No extrasystoles are present  Pulses: Normal pulses  Heart sounds: No murmur  Pulmonary:      Effort: Pulmonary effort is normal  No tachypnea, bradypnea or respiratory distress  Breath sounds: Normal breath sounds and air entry  No wheezing, rhonchi or rales  Abdominal:      General: Bowel sounds are normal  There is no distension  Tenderness: There is no abdominal tenderness  Musculoskeletal:      Right lower leg: No edema  Left lower leg: No edema  Skin:     General: Skin is warm and dry  Neurological:      General: No focal deficit present  Mental Status: He is alert and oriented to person, place, and time  Psychiatric:         Mood and Affect: Mood and affect normal          Speech: Speech normal          Behavior: Behavior normal  Behavior is cooperative  Thought Content: Thought content normal        Labs & Results:  Lab Results   Component Value Date    WBC 3 95 (L) 10/21/2018    HGB 14 3 10/21/2018    HCT 44 1 10/21/2018    MCV 89 10/21/2018     10/21/2018     Lab Results   Component Value Date    SODIUM 137 10/21/2018    K 4 4 10/21/2018     10/21/2018    CO2 30 10/21/2018    BUN 14 10/21/2018    CREATININE 0 93 10/21/2018    GLUC 103 07/31/2017    CALCIUM 8 8 10/21/2018     Lab Results   Component Value Date    INR 1 05 07/27/2017    PROTIME 13 7 07/27/2017     No results found for: NTBNP   EKG personally reviewed by Sukhwinder Dickerson PA-C  Counseling / Coordination of Care:   Today, 40 minutes were spent with patient during which greater than 50% of this time was spent in counseling/coordination of care regarding low sodium diet, fluid restriction, daily weights, and importance of medication compliance  Thank you for the opportunity to participate in the care of this patient      Sabino Sin PA-C

## 2020-09-21 ENCOUNTER — APPOINTMENT (OUTPATIENT)
Dept: LAB | Facility: MEDICAL CENTER | Age: 45
End: 2020-09-21
Payer: COMMERCIAL

## 2020-09-21 ENCOUNTER — OFFICE VISIT (OUTPATIENT)
Dept: CARDIOLOGY CLINIC | Facility: CLINIC | Age: 45
End: 2020-09-21
Payer: COMMERCIAL

## 2020-09-21 VITALS
WEIGHT: 225.4 LBS | SYSTOLIC BLOOD PRESSURE: 110 MMHG | HEART RATE: 78 BPM | BODY MASS INDEX: 28.93 KG/M2 | DIASTOLIC BLOOD PRESSURE: 80 MMHG | HEIGHT: 74 IN | TEMPERATURE: 96.9 F | OXYGEN SATURATION: 99 %

## 2020-09-21 DIAGNOSIS — R73.9 HYPERGLYCEMIA: ICD-10-CM

## 2020-09-21 DIAGNOSIS — F41.1 GENERALIZED ANXIETY DISORDER: ICD-10-CM

## 2020-09-21 DIAGNOSIS — E78.00 PURE HYPERCHOLESTEROLEMIA: ICD-10-CM

## 2020-09-21 DIAGNOSIS — I31.9 MYOPERICARDITIS: Primary | ICD-10-CM

## 2020-09-21 DIAGNOSIS — E78.5 DYSLIPIDEMIA: ICD-10-CM

## 2020-09-21 LAB
ALBUMIN SERPL BCP-MCNC: 3.9 G/DL (ref 3.5–5)
ALP SERPL-CCNC: 65 U/L (ref 46–116)
ALT SERPL W P-5'-P-CCNC: 25 U/L (ref 12–78)
ANION GAP SERPL CALCULATED.3IONS-SCNC: 4 MMOL/L (ref 4–13)
AST SERPL W P-5'-P-CCNC: 14 U/L (ref 5–45)
BASOPHILS # BLD AUTO: 0.07 THOUSANDS/ΜL (ref 0–0.1)
BASOPHILS NFR BLD AUTO: 2 % (ref 0–1)
BILIRUB SERPL-MCNC: 0.45 MG/DL (ref 0.2–1)
BILIRUB UR QL STRIP: NEGATIVE
BUN SERPL-MCNC: 14 MG/DL (ref 5–25)
CALCIUM SERPL-MCNC: 9 MG/DL (ref 8.3–10.1)
CHLORIDE SERPL-SCNC: 110 MMOL/L (ref 100–108)
CHOLEST SERPL-MCNC: 236 MG/DL (ref 50–200)
CLARITY UR: CLEAR
CO2 SERPL-SCNC: 27 MMOL/L (ref 21–32)
COLOR UR: YELLOW
CREAT SERPL-MCNC: 1.07 MG/DL (ref 0.6–1.3)
EOSINOPHIL # BLD AUTO: 0.26 THOUSAND/ΜL (ref 0–0.61)
EOSINOPHIL NFR BLD AUTO: 6 % (ref 0–6)
ERYTHROCYTE [DISTWIDTH] IN BLOOD BY AUTOMATED COUNT: 13.1 % (ref 11.6–15.1)
EST. AVERAGE GLUCOSE BLD GHB EST-MCNC: 117 MG/DL
GFR SERPL CREATININE-BSD FRML MDRD: 85 ML/MIN/1.73SQ M
GLUCOSE P FAST SERPL-MCNC: 116 MG/DL (ref 65–99)
GLUCOSE UR STRIP-MCNC: NEGATIVE MG/DL
HBA1C MFR BLD: 5.7 %
HCT VFR BLD AUTO: 44.6 % (ref 36.5–49.3)
HDLC SERPL-MCNC: 50 MG/DL
HGB BLD-MCNC: 14.9 G/DL (ref 12–17)
HGB UR QL STRIP.AUTO: NEGATIVE
IMM GRANULOCYTES # BLD AUTO: 0.02 THOUSAND/UL (ref 0–0.2)
IMM GRANULOCYTES NFR BLD AUTO: 1 % (ref 0–2)
KETONES UR STRIP-MCNC: NEGATIVE MG/DL
LDLC SERPL CALC-MCNC: 147 MG/DL (ref 0–100)
LEUKOCYTE ESTERASE UR QL STRIP: NEGATIVE
LYMPHOCYTES # BLD AUTO: 1.84 THOUSANDS/ΜL (ref 0.6–4.47)
LYMPHOCYTES NFR BLD AUTO: 44 % (ref 14–44)
MCH RBC QN AUTO: 30.5 PG (ref 26.8–34.3)
MCHC RBC AUTO-ENTMCNC: 33.4 G/DL (ref 31.4–37.4)
MCV RBC AUTO: 91 FL (ref 82–98)
MONOCYTES # BLD AUTO: 0.43 THOUSAND/ΜL (ref 0.17–1.22)
MONOCYTES NFR BLD AUTO: 11 % (ref 4–12)
NEUTROPHILS # BLD AUTO: 1.49 THOUSANDS/ΜL (ref 1.85–7.62)
NEUTS SEG NFR BLD AUTO: 36 % (ref 43–75)
NITRITE UR QL STRIP: NEGATIVE
NONHDLC SERPL-MCNC: 186 MG/DL
NRBC BLD AUTO-RTO: 0 /100 WBCS
PH UR STRIP.AUTO: 5.5 [PH]
PLATELET # BLD AUTO: 289 THOUSANDS/UL (ref 149–390)
PMV BLD AUTO: 11.4 FL (ref 8.9–12.7)
POTASSIUM SERPL-SCNC: 4.5 MMOL/L (ref 3.5–5.3)
PROT SERPL-MCNC: 7.3 G/DL (ref 6.4–8.2)
PROT UR STRIP-MCNC: NEGATIVE MG/DL
RBC # BLD AUTO: 4.89 MILLION/UL (ref 3.88–5.62)
SODIUM SERPL-SCNC: 141 MMOL/L (ref 136–145)
SP GR UR STRIP.AUTO: 1.02 (ref 1–1.03)
TRIGL SERPL-MCNC: 194 MG/DL
UROBILINOGEN UR QL STRIP.AUTO: 0.2 E.U./DL
WBC # BLD AUTO: 4.11 THOUSAND/UL (ref 4.31–10.16)

## 2020-09-21 PROCEDURE — 85025 COMPLETE CBC W/AUTO DIFF WBC: CPT

## 2020-09-21 PROCEDURE — 99215 OFFICE O/P EST HI 40 MIN: CPT | Performed by: PHYSICIAN ASSISTANT

## 2020-09-21 PROCEDURE — 81003 URINALYSIS AUTO W/O SCOPE: CPT | Performed by: FAMILY MEDICINE

## 2020-09-21 PROCEDURE — 3079F DIAST BP 80-89 MM HG: CPT | Performed by: PHYSICIAN ASSISTANT

## 2020-09-21 PROCEDURE — 80061 LIPID PANEL: CPT

## 2020-09-21 PROCEDURE — 36415 COLL VENOUS BLD VENIPUNCTURE: CPT

## 2020-09-21 PROCEDURE — 83036 HEMOGLOBIN GLYCOSYLATED A1C: CPT

## 2020-09-21 PROCEDURE — 80053 COMPREHEN METABOLIC PANEL: CPT

## 2020-09-21 PROCEDURE — 1036F TOBACCO NON-USER: CPT | Performed by: PHYSICIAN ASSISTANT

## 2020-09-21 RX ORDER — COLCHICINE 0.6 MG/1
0.3 TABLET ORAL 2 TIMES DAILY
Qty: 14 TABLET | Refills: 1 | Status: SHIPPED | OUTPATIENT
Start: 2020-09-21

## 2020-10-16 DIAGNOSIS — I15.9 SECONDARY HYPERTENSION: ICD-10-CM

## 2020-10-16 RX ORDER — METOPROLOL SUCCINATE 25 MG/1
25 TABLET, EXTENDED RELEASE ORAL DAILY
Qty: 90 TABLET | Refills: 3 | Status: SHIPPED | OUTPATIENT
Start: 2020-10-16

## 2020-12-17 DIAGNOSIS — F41.1 GENERALIZED ANXIETY DISORDER: ICD-10-CM

## 2020-12-17 RX ORDER — BUSPIRONE HYDROCHLORIDE 10 MG/1
TABLET ORAL
Qty: 180 TABLET | Refills: 0 | Status: SHIPPED | OUTPATIENT
Start: 2020-12-17 | End: 2021-03-14 | Stop reason: SDUPTHER

## 2021-03-13 DIAGNOSIS — F41.1 GENERALIZED ANXIETY DISORDER: ICD-10-CM

## 2021-03-14 RX ORDER — BUSPIRONE HYDROCHLORIDE 10 MG/1
TABLET ORAL
Qty: 180 TABLET | Refills: 0 | Status: SHIPPED | OUTPATIENT
Start: 2021-03-14

## 2022-07-27 NOTE — PROGRESS NOTES
Daily Note     Today's date: 2018  Patient name: Neftaly Rodarte  : 1975  MRN: [id number]  Referring provider: Albaro Reyes DO  Dx:   Encounter Diagnosis     ICD-10-CM    1  Acute midline thoracic back pain M54 6                   Subjective: Patient reports that he is feeling improvement  Today he was carrying items up and down stairs and worked out at [employer] for the first time the other day, so his muscles are a little sore  Objective: See treatment diary below    Precautions: None    Daily Treatment Diary     Manual           Seated CTJ Grade V AZ  GradeV AZ Grade V AZ         Thoracic mobs UPA for rotation L  AZ AZ AZ         STM/MFR                                           Exercise Diary                           Diaphragmatic breathing 2' 2' 2' 2'         DLS barbara  45A 74P 20x         LTR  20x 20x 20x         Dead bugs   2x10 3x10         Open books  10x b/l 10x b/l 10x b/l         Banded pull aparts on foam   30x blue 30x blue         Thoracic extensions foam   20x 20x         Thoracic rotations quadruped    10x b/l         Scap 4    W's GR 20x 5s                                                                                                                                                                                                                Modalities                                                       Assessment:  Patient presents with thoracic rotation right T5-T8 and paraspinal m tightness on the right  Progressed patient with scapular mobility, as well as scapular and core stabilization exercises  No pain throughout  Patient requires verbal cues during diaphragmatic breathing and core stabilization exercises to decrease tension and to enhance neuromuscular control  Tolerated treatment well  Patient would benefit from continued PT  Plan: Continue per plan of care  Pt sleeping soundly. Pt offered morphine for pain but declined at this time stating he was not currently in any pain.
